# Patient Record
Sex: FEMALE | Race: WHITE | Employment: OTHER | URBAN - METROPOLITAN AREA
[De-identification: names, ages, dates, MRNs, and addresses within clinical notes are randomized per-mention and may not be internally consistent; named-entity substitution may affect disease eponyms.]

---

## 2017-02-27 ENCOUNTER — OFFICE VISIT (OUTPATIENT)
Dept: FAMILY MEDICINE CLINIC | Age: 62
End: 2017-02-27

## 2017-02-27 VITALS
WEIGHT: 293 LBS | DIASTOLIC BLOOD PRESSURE: 70 MMHG | BODY MASS INDEX: 52.42 KG/M2 | SYSTOLIC BLOOD PRESSURE: 122 MMHG | HEART RATE: 52 BPM | OXYGEN SATURATION: 98 %

## 2017-02-27 DIAGNOSIS — R49.0 VOICE HOARSENESS: ICD-10-CM

## 2017-02-27 DIAGNOSIS — J06.9 URI, ACUTE: Primary | ICD-10-CM

## 2017-02-27 PROCEDURE — 99213 OFFICE O/P EST LOW 20 MIN: CPT | Performed by: FAMILY MEDICINE

## 2017-02-27 RX ORDER — LORATADINE 10 MG/1
10 TABLET ORAL DAILY
Qty: 30 TABLET | Refills: 0 | Status: SHIPPED | OUTPATIENT
Start: 2017-02-27 | End: 2018-10-01

## 2017-02-27 RX ORDER — FLUTICASONE PROPIONATE 50 MCG
2 SPRAY, SUSPENSION (ML) NASAL DAILY
Qty: 1 BOTTLE | Refills: 1 | Status: SHIPPED | OUTPATIENT
Start: 2017-02-27 | End: 2017-06-13 | Stop reason: ALTCHOICE

## 2017-03-07 ENCOUNTER — OFFICE VISIT (OUTPATIENT)
Dept: FAMILY MEDICINE CLINIC | Age: 62
End: 2017-03-07

## 2017-03-07 VITALS
BODY MASS INDEX: 52.75 KG/M2 | HEART RATE: 75 BPM | WEIGHT: 293 LBS | OXYGEN SATURATION: 96 % | SYSTOLIC BLOOD PRESSURE: 110 MMHG | DIASTOLIC BLOOD PRESSURE: 66 MMHG

## 2017-03-07 DIAGNOSIS — R09.82 PND (POST-NASAL DRIP): ICD-10-CM

## 2017-03-07 DIAGNOSIS — J06.9 PROTRACTED URI: Primary | ICD-10-CM

## 2017-03-07 PROCEDURE — 99213 OFFICE O/P EST LOW 20 MIN: CPT | Performed by: FAMILY MEDICINE

## 2017-03-07 RX ORDER — AMOXICILLIN 500 MG/1
500 CAPSULE ORAL 3 TIMES DAILY
Qty: 21 CAPSULE | Refills: 0 | Status: SHIPPED | OUTPATIENT
Start: 2017-03-07 | End: 2017-03-14

## 2017-05-03 ENCOUNTER — TELEPHONE (OUTPATIENT)
Dept: FAMILY MEDICINE CLINIC | Age: 62
End: 2017-05-03

## 2017-05-03 DIAGNOSIS — Z00.00 WELL ADULT EXAM: ICD-10-CM

## 2017-05-03 DIAGNOSIS — E55.9 VITAMIN D DEFICIENCY: Primary | ICD-10-CM

## 2017-06-06 DIAGNOSIS — Z00.00 WELL ADULT EXAM: ICD-10-CM

## 2017-06-06 DIAGNOSIS — E55.9 VITAMIN D DEFICIENCY: ICD-10-CM

## 2017-06-06 LAB
A/G RATIO: 1.7 (ref 1.1–2.2)
ALBUMIN SERPL-MCNC: 4.2 G/DL (ref 3.4–5)
ALP BLD-CCNC: 55 U/L (ref 40–129)
ALT SERPL-CCNC: 13 U/L (ref 10–40)
ANION GAP SERPL CALCULATED.3IONS-SCNC: 18 MMOL/L (ref 3–16)
AST SERPL-CCNC: 18 U/L (ref 15–37)
BASOPHILS ABSOLUTE: 0 K/UL (ref 0–0.2)
BASOPHILS RELATIVE PERCENT: 0.6 %
BILIRUB SERPL-MCNC: 0.4 MG/DL (ref 0–1)
BUN BLDV-MCNC: 19 MG/DL (ref 7–20)
CALCIUM SERPL-MCNC: 9 MG/DL (ref 8.3–10.6)
CHLORIDE BLD-SCNC: 98 MMOL/L (ref 99–110)
CHOLESTEROL, TOTAL: 150 MG/DL (ref 0–199)
CO2: 23 MMOL/L (ref 21–32)
CREAT SERPL-MCNC: 0.7 MG/DL (ref 0.6–1.2)
EOSINOPHILS ABSOLUTE: 0.1 K/UL (ref 0–0.6)
EOSINOPHILS RELATIVE PERCENT: 1.5 %
GFR AFRICAN AMERICAN: >60
GFR NON-AFRICAN AMERICAN: >60
GLOBULIN: 2.5 G/DL
GLUCOSE BLD-MCNC: 124 MG/DL (ref 70–99)
HCT VFR BLD CALC: 45.2 % (ref 36–48)
HDLC SERPL-MCNC: 43 MG/DL (ref 40–60)
HEMOGLOBIN: 14.8 G/DL (ref 12–16)
LDL CHOLESTEROL CALCULATED: 90 MG/DL
LYMPHOCYTES ABSOLUTE: 1.8 K/UL (ref 1–5.1)
LYMPHOCYTES RELATIVE PERCENT: 24.1 %
MCH RBC QN AUTO: 32.5 PG (ref 26–34)
MCHC RBC AUTO-ENTMCNC: 32.8 G/DL (ref 31–36)
MCV RBC AUTO: 99 FL (ref 80–100)
MONOCYTES ABSOLUTE: 0.5 K/UL (ref 0–1.3)
MONOCYTES RELATIVE PERCENT: 6 %
NEUTROPHILS ABSOLUTE: 5.1 K/UL (ref 1.7–7.7)
NEUTROPHILS RELATIVE PERCENT: 67.8 %
PDW BLD-RTO: 14.1 % (ref 12.4–15.4)
PLATELET # BLD: 288 K/UL (ref 135–450)
PMV BLD AUTO: 8.8 FL (ref 5–10.5)
POTASSIUM SERPL-SCNC: 4.3 MMOL/L (ref 3.5–5.1)
RBC # BLD: 4.56 M/UL (ref 4–5.2)
SODIUM BLD-SCNC: 139 MMOL/L (ref 136–145)
TOTAL PROTEIN: 6.7 G/DL (ref 6.4–8.2)
TRIGL SERPL-MCNC: 86 MG/DL (ref 0–150)
VLDLC SERPL CALC-MCNC: 17 MG/DL
WBC # BLD: 7.5 K/UL (ref 4–11)

## 2017-06-07 LAB
TSH REFLEX: 3.26 UIU/ML (ref 0.27–4.2)
VITAMIN D 25-HYDROXY: 19.7 NG/ML

## 2017-06-13 ENCOUNTER — OFFICE VISIT (OUTPATIENT)
Dept: FAMILY MEDICINE CLINIC | Age: 62
End: 2017-06-13

## 2017-06-13 VITALS
HEIGHT: 65 IN | DIASTOLIC BLOOD PRESSURE: 60 MMHG | OXYGEN SATURATION: 98 % | BODY MASS INDEX: 48.82 KG/M2 | HEART RATE: 77 BPM | SYSTOLIC BLOOD PRESSURE: 112 MMHG | WEIGHT: 293 LBS | RESPIRATION RATE: 14 BRPM

## 2017-06-13 DIAGNOSIS — Z00.00 ANNUAL PHYSICAL EXAM: Primary | ICD-10-CM

## 2017-06-13 DIAGNOSIS — E55.9 VITAMIN D DEFICIENCY: ICD-10-CM

## 2017-06-13 DIAGNOSIS — R73.03 PREDIABETES: ICD-10-CM

## 2017-06-13 DIAGNOSIS — E66.01 MORBID OBESITY DUE TO EXCESS CALORIES (HCC): ICD-10-CM

## 2017-06-13 LAB
BILIRUBIN, POC: NORMAL
BLOOD URINE, POC: NORMAL
CLARITY, POC: CLEAR
COLOR, POC: YELLOW
GLUCOSE URINE, POC: NORMAL
KETONES, POC: NORMAL
LEUKOCYTE EST, POC: NORMAL
NITRITE, POC: NORMAL
PH, POC: 5.5
PROTEIN, POC: NORMAL
SPECIFIC GRAVITY, POC: 1.02
UROBILINOGEN, POC: 0.2

## 2017-06-13 PROCEDURE — 81002 URINALYSIS NONAUTO W/O SCOPE: CPT | Performed by: FAMILY MEDICINE

## 2017-06-13 PROCEDURE — 99396 PREV VISIT EST AGE 40-64: CPT | Performed by: FAMILY MEDICINE

## 2017-06-13 ASSESSMENT — PATIENT HEALTH QUESTIONNAIRE - PHQ9
1. LITTLE INTEREST OR PLEASURE IN DOING THINGS: 0
2. FEELING DOWN, DEPRESSED OR HOPELESS: 0
SUM OF ALL RESPONSES TO PHQ9 QUESTIONS 1 & 2: 0
SUM OF ALL RESPONSES TO PHQ QUESTIONS 1-9: 0

## 2017-06-21 ENCOUNTER — TELEPHONE (OUTPATIENT)
Dept: FAMILY MEDICINE CLINIC | Age: 62
End: 2017-06-21

## 2017-06-21 ENCOUNTER — OFFICE VISIT (OUTPATIENT)
Dept: FAMILY MEDICINE CLINIC | Age: 62
End: 2017-06-21

## 2017-06-21 VITALS
HEART RATE: 72 BPM | DIASTOLIC BLOOD PRESSURE: 78 MMHG | OXYGEN SATURATION: 97 % | RESPIRATION RATE: 16 BRPM | SYSTOLIC BLOOD PRESSURE: 128 MMHG

## 2017-06-21 DIAGNOSIS — Z01.419 WELL WOMAN EXAM: Primary | ICD-10-CM

## 2017-06-21 DIAGNOSIS — Z00.00 ANNUAL PHYSICAL EXAM: ICD-10-CM

## 2017-06-21 PROCEDURE — 93000 ELECTROCARDIOGRAM COMPLETE: CPT | Performed by: FAMILY MEDICINE

## 2017-06-21 PROCEDURE — 99396 PREV VISIT EST AGE 40-64: CPT | Performed by: FAMILY MEDICINE

## 2017-06-21 RX ORDER — MELATONIN
1 DAILY
COMMUNITY
End: 2017-06-21 | Stop reason: DRUGHIGH

## 2017-06-21 RX ORDER — ACETAMINOPHEN 160 MG
1 TABLET,DISINTEGRATING ORAL DAILY
COMMUNITY

## 2017-08-16 ENCOUNTER — OFFICE VISIT (OUTPATIENT)
Dept: FAMILY MEDICINE CLINIC | Age: 62
End: 2017-08-16

## 2017-08-16 VITALS
SYSTOLIC BLOOD PRESSURE: 118 MMHG | HEART RATE: 69 BPM | BODY MASS INDEX: 51.55 KG/M2 | WEIGHT: 293 LBS | DIASTOLIC BLOOD PRESSURE: 82 MMHG | OXYGEN SATURATION: 97 %

## 2017-08-16 DIAGNOSIS — E66.01 MORBID OBESITY DUE TO EXCESS CALORIES (HCC): Primary | ICD-10-CM

## 2017-08-16 DIAGNOSIS — E55.9 VITAMIN D DEFICIENCY: ICD-10-CM

## 2017-08-16 DIAGNOSIS — Z11.59 ENCOUNTER FOR HEPATITIS C SCREENING TEST FOR LOW RISK PATIENT: ICD-10-CM

## 2017-08-16 PROCEDURE — 99242 OFF/OP CONSLTJ NEW/EST SF 20: CPT | Performed by: FAMILY MEDICINE

## 2017-12-21 ENCOUNTER — OFFICE VISIT (OUTPATIENT)
Dept: FAMILY MEDICINE CLINIC | Age: 62
End: 2017-12-21

## 2017-12-21 VITALS
HEART RATE: 73 BPM | DIASTOLIC BLOOD PRESSURE: 80 MMHG | BODY MASS INDEX: 48.65 KG/M2 | OXYGEN SATURATION: 97 % | WEIGHT: 292 LBS | SYSTOLIC BLOOD PRESSURE: 128 MMHG | RESPIRATION RATE: 14 BRPM | HEIGHT: 65 IN

## 2017-12-21 DIAGNOSIS — E66.01 MORBID OBESITY (HCC): ICD-10-CM

## 2017-12-21 DIAGNOSIS — E55.9 VITAMIN D DEFICIENCY: Primary | ICD-10-CM

## 2017-12-21 DIAGNOSIS — F43.21 GRIEF: ICD-10-CM

## 2017-12-21 PROCEDURE — 99214 OFFICE O/P EST MOD 30 MIN: CPT | Performed by: FAMILY MEDICINE

## 2017-12-21 NOTE — PATIENT INSTRUCTIONS
Patient Education          calcitriol (vitamin D3)  Pronunciation:  laureen abel BERGERON ol  Brand:  Rocaltrol  What is the most important information I should know about calcitriol? You should not use this medication if you have high levels of calcium or vitamin D in your blood, or if you have ever had an allergic reaction to calcitriol or other forms of vitamin D. Drink plenty of fluids unless your doctor has told you to restrict your fluid intake. Call your doctor if you have ongoing vomiting or diarrhea, or if you are sweating more than usual. You can easily become dehydrated while taking this medication, which can lead to a serious electrolyte imbalance. Do not take other vitamin or mineral supplements unless your doctor has told you to. Avoid using antacids without your doctor's advice. Use only the type of antacid your doctor recommends. Some antacids can make it harder for your body to absorb calcitriol. To be sure this medication is helping your condition, your blood will need to be tested on a regular basis. Do not miss any scheduled appointments. Calcitriol is only part of a complete program of treatment that may also include a special diet. Follow your diet and medication routines very closely. You should become very familiar with the list of foods you must eat or avoid to help control your condition. What is calcitriol? Calcitriol is vitamin D3. Vitamin D is important for the absorption of calcium from the stomach and for the functioning of calcium in the body. Calcitriol is used to treat hyperparathyroidism (overactive parathyroid glands) and metabolic bone disease in people who have chronic kidney failure and are not receiving dialysis. Calcitriol is also used to treat calcium deficiency (hypocalcemia) and metabolic bone disease in people who are receiving dialysis.   Calcitriol is also used to treat calcium deficiency in people with hypoparathyroidism (underactive parathyroid glands) caused by become very familiar with the list of foods you must eat or avoid to help control your condition. If you need to be on bed-rest or have any type of surgery, your dose or testing needs may change. Follow your doctor's instructions. Store at room temperature away from moisture, light, and heat. What happens if I miss a dose? Take the missed dose as soon as you remember. Skip the missed dose if it is almost time for your next scheduled dose. Do not  take extra medicine to make up the missed dose. What happens if I overdose? Seek emergency medical attention or call the Poison Help line at 1-890.561.3821. Overdose symptoms may include some of the serious side effects listed in this medication guide. What should I avoid while taking calcitriol? Do not take other vitamin or mineral supplements unless your doctor has told you to. Avoid using antacids without your doctor's advice. Use only the type of antacid your doctor recommends. Some antacids can make it harder for your body to absorb calcitriol. What are the possible side effects of calcitriol? Get emergency medical help if you have any of these signs of an allergic reaction: hives; difficulty breathing; swelling of your face, lips, tongue, or throat. Stop using calcitriol and call your doctor at once if you have a serious side effect such as:  · weakness, headache, drowsiness;  · nausea, vomiting, stomach pain, constipation;  · loss of appetite;  · dry mouth, increased thirst;  · metallic taste in your mouth;  · urinating more than usual;  · fast, slow, or uneven heart rate;  · changes in behavior;  · muscle pain, bone pain, muscle weakness, loss of height;  · slow growth (in a child taking calcitriol); or  · severe pain in your upper stomach spreading to your back. This is not a complete list of side effects and others may occur. Call your doctor for medical advice about side effects. You may report side effects to FDA at 5-746-VGU-4583.   What other

## 2017-12-21 NOTE — PROGRESS NOTES
Joslyn Hernandez is a 63 y.o. female. HPI:  Here for general checkup   passed away recently but she is doing well has lost the family and community support  Only medicines vitamin D  Appetites been weak as her  was getting sicker and sicker  She is coping by taking walks and spending more time with family friends and [employer]  Mild cold the last 2 or 3 days was no full cough and sore throat  Wt Readings from Last 3 Encounters:   12/21/17 292 lb (132.5 kg)   08/16/17 (!) 309 lb 12.8 oz (140.5 kg)   06/13/17 (!) 315 lb (142.9 kg)     Meds, vitamins and allergies reviewed with Patient    ROS:  Gen: No fever  HEENT: Mild cold symptoms, slight sore throat. CV:  Denies chest pain or palpitations. Pulm:  Denies shortness of breath, nonproductive cough. Abd:  Denies abdominal pain, nausea and vomiting. Skin: no rash    Allergies   Allergen Reactions    Levaquin [Levofloxacin In D5w] Other (See Comments)     headache       Prior to Visit Medications    Medication Sig Taking? Authorizing Provider   Cholecalciferol (VITAMIN D3) 2000 units CAPS Take 1 capsule by mouth daily Yes Historical Provider, MD   loratadine (CLARITIN) 10 MG tablet Take 1 tablet by mouth daily  Nat Leahy MD       OBJECTIVE:  /80 (Site: Left Arm, Position: Sitting, Cuff Size: Medium Adult)   Pulse 73   Resp 14   Ht 5' 5\" (1.651 m)   Wt 292 lb (132.5 kg)   SpO2 97%   BMI 48.59 kg/m²   GEN:  in NADObese down 15 pounds  HEENT:  NCAT, TMs:normal and throat: Slightly red no exudate  NECK:  Supple without adenopathy. CV:  Regular rate and rhythm, S1 and S2 normal, no murmurs, clicks  PULM:  Chest is clear, no wheezing ,  symmetric air entry throughout both lung fields  Vitamin D-39  Hepatitis C negative  ASSESSMENT/PLAN:  1. Vitamin D deficiency  Corrected, continue present supplementation    2. Morbid obesity ([address]  Continue efforts at weight loss with smart diet and regular exercise    3.  Grief  Grieving as expected, offered stress further support as needed    4 uri supportive treatment no need for antibiotics    #5 immunizations up-to-date    Spent 25 minutes with patient greater than 50% of time

## 2018-01-02 ENCOUNTER — OFFICE VISIT (OUTPATIENT)
Dept: FAMILY MEDICINE CLINIC | Age: 63
End: 2018-01-02

## 2018-01-02 VITALS
RESPIRATION RATE: 14 BRPM | HEIGHT: 65 IN | WEIGHT: 293 LBS | TEMPERATURE: 98.4 F | HEART RATE: 96 BPM | OXYGEN SATURATION: 97 % | SYSTOLIC BLOOD PRESSURE: 130 MMHG | DIASTOLIC BLOOD PRESSURE: 80 MMHG | BODY MASS INDEX: 48.82 KG/M2

## 2018-01-02 DIAGNOSIS — N76.4 BOIL, LABIUM: Primary | ICD-10-CM

## 2018-01-02 PROCEDURE — 99211 OFF/OP EST MAY X REQ PHY/QHP: CPT | Performed by: FAMILY MEDICINE

## 2018-01-02 RX ORDER — CEPHALEXIN 500 MG/1
500 CAPSULE ORAL 3 TIMES DAILY
Qty: 21 CAPSULE | Refills: 0 | Status: SHIPPED | OUTPATIENT
Start: 2018-01-02 | End: 2018-08-20 | Stop reason: ALTCHOICE

## 2018-01-02 NOTE — PROGRESS NOTES
Subjective:      Patient ID: Kajal Garcia is a 63 y.o. female.     HPI soreness in the right vaginal area for the last 4 days  Has spontaneous drainage yesterday and now the pain is much less    Review of Systems  No fever or chills  Objective:   Physical Exam  Small right labial abscess that appears to have dried up it is open to Katie Wilson might 2 mm hole was serosanguineous fluid no pus or redness  Assessment:      Right labial abscess with spontaneous drainage      Plan:      Hot compresses   Keflex   Warning signs

## 2018-01-16 ENCOUNTER — TELEPHONE (OUTPATIENT)
Dept: FAMILY MEDICINE CLINIC | Age: 63
End: 2018-01-16

## 2018-03-16 ENCOUNTER — OFFICE VISIT (OUTPATIENT)
Dept: FAMILY MEDICINE CLINIC | Age: 63
End: 2018-03-16

## 2018-03-16 VITALS
SYSTOLIC BLOOD PRESSURE: 124 MMHG | HEART RATE: 70 BPM | BODY MASS INDEX: 48.06 KG/M2 | OXYGEN SATURATION: 98 % | DIASTOLIC BLOOD PRESSURE: 76 MMHG | WEIGHT: 288.8 LBS

## 2018-03-16 DIAGNOSIS — E66.01 MORBID OBESITY (HCC): ICD-10-CM

## 2018-03-16 DIAGNOSIS — F43.21 GRIEF: ICD-10-CM

## 2018-03-16 DIAGNOSIS — E55.9 VITAMIN D DEFICIENCY: Primary | ICD-10-CM

## 2018-03-16 PROCEDURE — 99212 OFFICE O/P EST SF 10 MIN: CPT | Performed by: FAMILY MEDICINE

## 2018-03-16 NOTE — PROGRESS NOTES
murmurs, clicks  PULM:  Chest is clear, no wheezing ,  symmetric air entry throughout both lung fields. ABD: Soft, NT  Pelvic exam deferred patient preference    ASSESSMENT/PLAN:  1. Vitamin D deficiency   over-the-counter vitamin D    2. Morbid obesity ([address]  Continue going to the health club and eating sensibly    3.  Grief  Suggested grief counseling through [employer] that she has no insurance    #4 STD exposure patient appears to be a very low risk having had unprotected intercourse just wants with a  man  Told her what worrisome symptoms to look for  Safe sex in the future  Pap and pelvic exam once she has insurance

## 2018-07-23 ENCOUNTER — PROCEDURE VISIT (OUTPATIENT)
Dept: SURGERY | Age: 63
End: 2018-07-23

## 2018-07-23 VITALS
HEART RATE: 71 BPM | DIASTOLIC BLOOD PRESSURE: 84 MMHG | BODY MASS INDEX: 44.36 KG/M2 | TEMPERATURE: 98.6 F | OXYGEN SATURATION: 96 % | WEIGHT: 266.6 LBS | SYSTOLIC BLOOD PRESSURE: 142 MMHG

## 2018-07-23 DIAGNOSIS — C44.319 BASAL CELL CARCINOMA OF RIGHT FOREHEAD: Primary | ICD-10-CM

## 2018-07-23 PROCEDURE — 17312 MOHS ADDL STAGE: CPT | Performed by: DERMATOLOGY

## 2018-07-23 PROCEDURE — 14040 TIS TRNFR F/C/C/M/N/A/G/H/F: CPT | Performed by: DERMATOLOGY

## 2018-07-23 PROCEDURE — 17311 MOHS 1 STAGE H/N/HF/G: CPT | Performed by: DERMATOLOGY

## 2018-07-23 NOTE — PROGRESS NOTES
MOHS PROCEDURE NOTE    PHYSICIAN:  Carlos Monterroso. Zahira Sahni MD    ASSISTANT: Johana Owusu LPN       REFERRING PROVIDER:  Idania Gayle MD      PREOPERATIVE DIAGNOSIS: Nodular Basal Cell Carcinoma     SPECIFIC MOHS INDICATIONS:  location    AUC SCORIN/9    POSTOPERATIVE DIAGNOSIS: SAME    LOCATION: Right forehead    OPERATIVE PROCEDURE:  MOHS MICROGRAPHIC SURGERY    RECONSTRUCTION OF DEFECT: Unilateral Advancement Flap    PREOPERATIVE SIZE: 9x8 MM    DEFECT SIZE: 13x13 MM    LENGTH OF REPAIRED WOUND/SIZE OF FLAP/SIZE OF GRAFT:  29x20 MM = 5.8cm2    ANESTHESIA:  3.5mL 1% lidocaine with epinephrine 1:100,000 buffered. EBL:  MINIMAL    DURATION OF PROCEDURE:  1 HOURS    POSTOPERATIVE OBSERVATION: 1 HOUR    SPECIMENS:  SEE MOHS MAP    COMPLICATIONS:  NONE    DESCRIPTION OF PROCEDURE:  The patient was given a mirror, as appropriate, and the biopsy site was identified, marked with a surgical marking pen, and verified by the patient. Options for treatment were discussed and the patient was informed that Mohs surgery was the selected treatment based on its lower recurrence rate, given the features listed above, as compared to other treatment modalities such as excision, radiation, or curettage, and agreed with this treatment plan. Risks and benefits including bruising, swelling, bleeding, infection, nerve injury, recurrence, and scarring were discussed with the patient prior to the procedure and a written consent detailing these and other risks was reviewed with the patient and signed. There was a time out for person and procedure verification. The surgical site was prepped with an antiseptic solution. Application of an antiseptic solution was repeated before each surgical stage. Stage I:  The clinically-apparent tumor was carefully defined and debulked, determining the edge of the surgical excision.     A thin layer of tumor-laden tissue was excised with a narrow margin of normal-appearing skin, using the

## 2018-07-24 ENCOUNTER — TELEPHONE (OUTPATIENT)
Dept: SURGERY | Age: 63
End: 2018-07-24

## 2018-07-24 NOTE — TELEPHONE ENCOUNTER
The patient was in the office on 7/25/18 for mohs located on the right forehead with unilateral advancement flap repair. The patient tolerated the procedure well and left the office in good condition. A post-operative telephone call was placed at 2:15 pm in order to check on the patient's recovery process. The patient reported doing well and had no complaints. All of the patient's questions were answered.

## 2018-07-30 ENCOUNTER — NURSE ONLY (OUTPATIENT)
Dept: SURGERY | Age: 63
End: 2018-07-30

## 2018-07-30 DIAGNOSIS — Z48.02 VISIT FOR SUTURE REMOVAL: Primary | ICD-10-CM

## 2018-07-30 NOTE — PROGRESS NOTES
S:  The patient is here for suture removal s/p Mohs surgery on the Right forehead and Unilateral Advancement Flap repair, 1 week(s) ago. The site appears well-healed without signs of infection (redness, pain or discharge). The sutures were removed. Wound care and activity instructions given. The patient was scheduled for follow-up as needed for scar/wound check. The patient was scheduled for f/u with General Dermatology per their instructions.

## 2018-07-30 NOTE — PATIENT INSTRUCTIONS
WOUND CARE AFTER SUTURE REMOVAL    After your stiches have been removed, your scar is still very fragile. In fact, scars continue to change and evolve, what we call remodel, for about a year after your procedure. Follow the following steps below to ensure that your scar heals well. Instructions    1. If Steri-strips were applied, keep them on until they fall off on their own. 2. Protect your scar from the sun. Use a sunscreen or bandage to cover your scar. Tran Ng exposure can cause your scar to become discolored and appear red or brown. 3. To help soften your scar more rapidly, it is helpful, but not necessary, for you to   massage the scar gently each night for twenty minutes. 4. Spitting suture. Occasionally, an inside suture (stitch) does not completely dissolve. When this happens, (generally 4-8 weeks after surgery), it causes a bump or pimple to form on the scar. This is easily removed and is not at all serious. It   does not mean the skin cancer has returned. Contact us if it happens, but do not be alarmed. 5. If you scar becomes tender, itchy or becomes very large, let Dr. Justin Malave know. There    are treatments that can improve the appearance of your scar or help make it more comfortable.

## 2018-08-20 ENCOUNTER — PROCEDURE VISIT (OUTPATIENT)
Dept: SURGERY | Age: 63
End: 2018-08-20

## 2018-08-20 VITALS
OXYGEN SATURATION: 95 % | SYSTOLIC BLOOD PRESSURE: 138 MMHG | BODY MASS INDEX: 44.1 KG/M2 | DIASTOLIC BLOOD PRESSURE: 87 MMHG | HEART RATE: 46 BPM | WEIGHT: 265 LBS | TEMPERATURE: 97.5 F

## 2018-08-20 DIAGNOSIS — C44.319 BASAL CELL CARCINOMA (BCC) OF SKIN OF OTHER PART OF FACE: Primary | ICD-10-CM

## 2018-08-20 PROBLEM — C44.91 BASAL CELL CARCINOMA: Status: ACTIVE | Noted: 2018-08-20

## 2018-08-20 PROCEDURE — 17311 MOHS 1 STAGE H/N/HF/G: CPT | Performed by: DERMATOLOGY

## 2018-08-20 PROCEDURE — 12051 INTMD RPR FACE/MM 2.5 CM/<: CPT | Performed by: DERMATOLOGY

## 2018-08-20 NOTE — PROGRESS NOTES
MOHS PROCEDURE NOTE    PHYSICIAN:  Carlos Monterroso. Zahira Sahni MD    ASSISTANT: Lakshmi Mcintyre RN and Rupal Gonzalez     REFERRING PROVIDER:  Idania Gayle MD    PREOPERATIVE DIAGNOSIS: Nodular Basal Cell Carcinoma     SPECIFIC MOHS INDICATIONS:  location    AUC SCORIN/9    POSTOPERATIVE DIAGNOSIS: SAME    LOCATION: Right periorbital     OPERATIVE PROCEDURE:  MOHS MICROGRAPHIC SURGERY    RECONSTRUCTION OF DEFECT: Intermediate layered closure    PREOPERATIVE SIZE: 6x4 MM    DEFECT SIZE: 7x5 MM    LENGTH OF REPAIRED WOUND/SIZE OF FLAP/SIZE OF GRAFT:  25 MM    ANESTHESIA:  2.5mL 1% lidocaine with epinephrine 1:100,000 buffered. EBL:  MINIMAL    DURATION OF PROCEDURE:  1 HOURS    POSTOPERATIVE OBSERVATION: 30 MINUTES    SPECIMENS:  SEE MOHS MAP    COMPLICATIONS:  NONE    DESCRIPTION OF PROCEDURE:  The patient was given a mirror, as appropriate, and the biopsy site was identified, marked with a surgical marking pen, and verified by the patient. Options for treatment were discussed and the patient was informed that Mohs surgery was the selected treatment based on its lower recurrence rate, given the features listed above, as compared to other treatment modalities such as excision, radiation, or curettage, and agreed with this treatment plan. Risks and benefits including bruising, swelling, bleeding, infection, nerve injury, recurrence, and scarring were discussed with the patient prior to the procedure and a written consent detailing these and other risks was reviewed with the patient and signed. There was a time out for person and procedure verification. The surgical site was prepped with an antiseptic solution. Application of an antiseptic solution was repeated before each surgical stage. Stage I:  The clinically-apparent tumor was carefully defined and debulked, determining the edge of the surgical excision.     A thin layer of tumor-laden tissue was excised with a narrow margin of normal-appearing

## 2018-08-21 ENCOUNTER — TELEPHONE (OUTPATIENT)
Dept: SURGERY | Age: 63
End: 2018-08-21

## 2018-10-01 ENCOUNTER — OFFICE VISIT (OUTPATIENT)
Dept: FAMILY MEDICINE CLINIC | Age: 63
End: 2018-10-01

## 2018-10-01 VITALS
HEART RATE: 59 BPM | HEIGHT: 65 IN | DIASTOLIC BLOOD PRESSURE: 76 MMHG | SYSTOLIC BLOOD PRESSURE: 122 MMHG | OXYGEN SATURATION: 97 % | BODY MASS INDEX: 43.52 KG/M2 | WEIGHT: 261.2 LBS

## 2018-10-01 DIAGNOSIS — E66.01 MORBID OBESITY (HCC): Primary | ICD-10-CM

## 2018-10-01 DIAGNOSIS — E55.9 VITAMIN D DEFICIENCY: ICD-10-CM

## 2018-10-01 DIAGNOSIS — C44.319 BASAL CELL CARCINOMA (BCC) OF SKIN OF OTHER PART OF FACE: ICD-10-CM

## 2018-10-01 PROCEDURE — 99212 OFFICE O/P EST SF 10 MIN: CPT | Performed by: FAMILY MEDICINE

## 2018-10-01 ASSESSMENT — PATIENT HEALTH QUESTIONNAIRE - PHQ9
1. LITTLE INTEREST OR PLEASURE IN DOING THINGS: 0
2. FEELING DOWN, DEPRESSED OR HOPELESS: 0
SUM OF ALL RESPONSES TO PHQ QUESTIONS 1-9: 0
SUM OF ALL RESPONSES TO PHQ QUESTIONS 1-9: 0
SUM OF ALL RESPONSES TO PHQ9 QUESTIONS 1 & 2: 0

## 2019-09-17 ENCOUNTER — TELEPHONE (OUTPATIENT)
Dept: FAMILY MEDICINE CLINIC | Age: 64
End: 2019-09-17

## 2019-09-17 DIAGNOSIS — Z00.00 ANNUAL PHYSICAL EXAM: ICD-10-CM

## 2019-09-17 DIAGNOSIS — E55.9 VITAMIN D DEFICIENCY: Primary | ICD-10-CM

## 2019-09-17 DIAGNOSIS — Z12.39 SCREENING FOR BREAST CANCER: ICD-10-CM

## 2019-09-25 ENCOUNTER — HOSPITAL ENCOUNTER (OUTPATIENT)
Age: 64
Discharge: HOME | End: 2019-09-25
Payer: COMMERCIAL

## 2019-09-25 DIAGNOSIS — E55.9 VITAMIN D DEFICIENCY: ICD-10-CM

## 2019-09-25 DIAGNOSIS — Z00.00 ANNUAL PHYSICAL EXAM: ICD-10-CM

## 2019-09-25 LAB
A/G RATIO: 1.4 (ref 1.1–2.2)
ALBUMIN SERPL-MCNC: 4.3 G/DL (ref 3.4–5)
ALP BLD-CCNC: 58 U/L (ref 40–129)
ALT SERPL-CCNC: 10 U/L (ref 10–40)
ANION GAP SERPL CALCULATED.3IONS-SCNC: 13 MMOL/L (ref 3–16)
AST SERPL-CCNC: 18 U/L (ref 15–37)
BASOPHILS ABSOLUTE: 0.1 K/UL (ref 0–0.2)
BASOPHILS RELATIVE PERCENT: 0.8 %
BILIRUB SERPL-MCNC: 0.4 MG/DL (ref 0–1)
BUN BLDV-MCNC: 16 MG/DL (ref 7–20)
CALCIUM SERPL-MCNC: 9.8 MG/DL (ref 8.3–10.6)
CHLORIDE BLD-SCNC: 105 MMOL/L (ref 99–110)
CHOLESTEROL, TOTAL: 150 MG/DL (ref 0–199)
CO2: 25 MMOL/L (ref 21–32)
CREAT SERPL-MCNC: 0.8 MG/DL (ref 0.6–1.2)
EOSINOPHILS ABSOLUTE: 0.1 K/UL (ref 0–0.6)
EOSINOPHILS RELATIVE PERCENT: 0.8 %
GFR AFRICAN AMERICAN: >60
GFR NON-AFRICAN AMERICAN: >60
GLOBULIN: 3 G/DL
GLUCOSE BLD-MCNC: 115 MG/DL (ref 70–99)
HCT VFR BLD CALC: 44.1 % (ref 36–48)
HDLC SERPL-MCNC: 46 MG/DL (ref 40–60)
HEMOGLOBIN: 14.6 G/DL (ref 12–16)
HIV AG/AB: NORMAL
HIV ANTIGEN: NORMAL
HIV-1 ANTIBODY: NORMAL
HIV-2 AB: NORMAL
LDL CHOLESTEROL CALCULATED: 90 MG/DL
LYMPHOCYTES ABSOLUTE: 1.8 K/UL (ref 1–5.1)
LYMPHOCYTES RELATIVE PERCENT: 22.9 %
MCH RBC QN AUTO: 32.8 PG (ref 26–34)
MCHC RBC AUTO-ENTMCNC: 33.2 G/DL (ref 31–36)
MCV RBC AUTO: 98.8 FL (ref 80–100)
MONOCYTES ABSOLUTE: 0.5 K/UL (ref 0–1.3)
MONOCYTES RELATIVE PERCENT: 6.9 %
NEUTROPHILS ABSOLUTE: 5.5 K/UL (ref 1.7–7.7)
NEUTROPHILS RELATIVE PERCENT: 68.6 %
PDW BLD-RTO: 13.5 % (ref 12.4–15.4)
PLATELET # BLD: 309 K/UL (ref 135–450)
PMV BLD AUTO: 9.1 FL (ref 5–10.5)
POTASSIUM SERPL-SCNC: 5.1 MMOL/L (ref 3.5–5.1)
RBC # BLD: 4.46 M/UL (ref 4–5.2)
SODIUM BLD-SCNC: 143 MMOL/L (ref 136–145)
TOTAL PROTEIN: 7.3 G/DL (ref 6.4–8.2)
TRIGL SERPL-MCNC: 71 MG/DL (ref 0–150)
TSH SERPL DL<=0.05 MIU/L-ACNC: 3.14 UIU/ML (ref 0.27–4.2)
VITAMIN D 25-HYDROXY: 33.4 NG/ML
VLDLC SERPL CALC-MCNC: 14 MG/DL
WBC # BLD: 8 K/UL (ref 4–11)

## 2019-09-25 PROCEDURE — 86702 HIV-2 ANTIBODY: CPT

## 2019-09-25 PROCEDURE — 36415 COLL VENOUS BLD VENIPUNCTURE: CPT

## 2019-09-25 PROCEDURE — 83036 HEMOGLOBIN GLYCOSYLATED A1C: CPT

## 2019-09-25 PROCEDURE — 82306 VITAMIN D 25 HYDROXY: CPT

## 2019-09-25 PROCEDURE — 85025 COMPLETE CBC W/AUTO DIFF WBC: CPT

## 2019-09-25 PROCEDURE — 86701 HIV-1ANTIBODY: CPT

## 2019-09-25 PROCEDURE — 80061 LIPID PANEL: CPT

## 2019-09-25 PROCEDURE — 84443 ASSAY THYROID STIM HORMONE: CPT

## 2019-09-25 PROCEDURE — 80053 COMPREHEN METABOLIC PANEL: CPT

## 2019-09-25 PROCEDURE — 87390 HIV-1 AG IA: CPT

## 2019-09-26 LAB
ESTIMATED AVERAGE GLUCOSE: 114 MG/DL
HBA1C MFR BLD: 5.6 %

## 2019-10-07 ENCOUNTER — OFFICE VISIT (OUTPATIENT)
Dept: FAMILY MEDICINE CLINIC | Age: 64
End: 2019-10-07
Payer: COMMERCIAL

## 2019-10-07 VITALS
OXYGEN SATURATION: 96 % | SYSTOLIC BLOOD PRESSURE: 128 MMHG | DIASTOLIC BLOOD PRESSURE: 80 MMHG | HEIGHT: 65 IN | WEIGHT: 282 LBS | TEMPERATURE: 98 F | BODY MASS INDEX: 46.98 KG/M2 | HEART RATE: 55 BPM

## 2019-10-07 DIAGNOSIS — E55.9 VITAMIN D DEFICIENCY: ICD-10-CM

## 2019-10-07 DIAGNOSIS — Z00.00 ANNUAL PHYSICAL EXAM: ICD-10-CM

## 2019-10-07 DIAGNOSIS — C44.319 BASAL CELL CARCINOMA (BCC) OF SKIN OF OTHER PART OF FACE: Primary | ICD-10-CM

## 2019-10-07 DIAGNOSIS — E66.01 CLASS 3 SEVERE OBESITY DUE TO EXCESS CALORIES WITHOUT SERIOUS COMORBIDITY WITH BODY MASS INDEX (BMI) OF 40.0 TO 44.9 IN ADULT (HCC): ICD-10-CM

## 2019-10-07 LAB
BILIRUBIN, POC: NORMAL
BLOOD URINE, POC: NORMAL
CLARITY, POC: CLEAR
COLOR, POC: YELLOW
GLUCOSE URINE, POC: NORMAL
KETONES, POC: NORMAL
LEUKOCYTE EST, POC: NORMAL
NITRITE, POC: NORMAL
PH, POC: 6
PROTEIN, POC: NORMAL
SPECIFIC GRAVITY, POC: 1.02
UROBILINOGEN, POC: 0.2

## 2019-10-07 PROCEDURE — 99396 PREV VISIT EST AGE 40-64: CPT | Performed by: FAMILY MEDICINE

## 2019-10-07 ASSESSMENT — PATIENT HEALTH QUESTIONNAIRE - PHQ9
2. FEELING DOWN, DEPRESSED OR HOPELESS: 0
1. LITTLE INTEREST OR PLEASURE IN DOING THINGS: 0
SUM OF ALL RESPONSES TO PHQ QUESTIONS 1-9: 0
SUM OF ALL RESPONSES TO PHQ9 QUESTIONS 1 & 2: 0
SUM OF ALL RESPONSES TO PHQ QUESTIONS 1-9: 0

## 2019-10-08 ENCOUNTER — OFFICE VISIT (OUTPATIENT)
Dept: FAMILY MEDICINE CLINIC | Age: 64
End: 2019-10-08
Payer: COMMERCIAL

## 2019-10-08 VITALS
HEART RATE: 52 BPM | WEIGHT: 281.97 LBS | SYSTOLIC BLOOD PRESSURE: 122 MMHG | HEIGHT: 65 IN | DIASTOLIC BLOOD PRESSURE: 76 MMHG | OXYGEN SATURATION: 99 % | TEMPERATURE: 98.1 F | BODY MASS INDEX: 46.98 KG/M2

## 2019-10-08 DIAGNOSIS — Z78.0 MENOPAUSE: Primary | ICD-10-CM

## 2019-10-08 DIAGNOSIS — Z01.419 WELL WOMAN EXAM: ICD-10-CM

## 2019-10-08 DIAGNOSIS — E66.01 MORBID OBESITY (HCC): ICD-10-CM

## 2019-10-08 PROCEDURE — 99214 OFFICE O/P EST MOD 30 MIN: CPT | Performed by: FAMILY MEDICINE

## 2020-04-14 ENCOUNTER — TELEPHONE (OUTPATIENT)
Dept: FAMILY MEDICINE CLINIC | Age: 65
End: 2020-04-14

## 2020-04-14 ENCOUNTER — NURSE TRIAGE (OUTPATIENT)
Dept: OTHER | Facility: CLINIC | Age: 65
End: 2020-04-14

## 2020-04-14 NOTE — TELEPHONE ENCOUNTER
Received transfer from nurse triage recommending pt schedule with PCP within 3 days. Pt scheduled in office visit for at [facility] for 4/16/20. Per pt symptoms are:  Radiating pain from left shoulder to wrist  Not sore to touch  Started approximately 1 week ago, pain level jumped to 7 - 7.5 out of 10 on 4/13/20. Pt is asking if she should keep OV, pt stated she doesn't want to wait until 4/17/20 for VV.   Pt also asked if she can take Tylenol 500 MG for pain    Please contact pt at [phone] to advise

## 2020-04-14 NOTE — TELEPHONE ENCOUNTER
Can suggest ibuprofen 800 mg 3 times daily with food. I can see her at [facility] on Thursday if need be.

## 2020-04-16 ENCOUNTER — OFFICE VISIT (OUTPATIENT)
Dept: FAMILY MEDICINE CLINIC | Age: 65
End: 2020-04-16
Payer: COMMERCIAL

## 2020-04-16 VITALS
DIASTOLIC BLOOD PRESSURE: 88 MMHG | SYSTOLIC BLOOD PRESSURE: 138 MMHG | WEIGHT: 288 LBS | BODY MASS INDEX: 47.98 KG/M2 | OXYGEN SATURATION: 96 % | TEMPERATURE: 97.5 F | HEIGHT: 65 IN | HEART RATE: 78 BPM

## 2020-04-16 PROCEDURE — 99213 OFFICE O/P EST LOW 20 MIN: CPT | Performed by: FAMILY MEDICINE

## 2020-04-16 ASSESSMENT — PATIENT HEALTH QUESTIONNAIRE - PHQ9
SUM OF ALL RESPONSES TO PHQ QUESTIONS 1-9: 0
1. LITTLE INTEREST OR PLEASURE IN DOING THINGS: 0
2. FEELING DOWN, DEPRESSED OR HOPELESS: 0
SUM OF ALL RESPONSES TO PHQ9 QUESTIONS 1 & 2: 0
SUM OF ALL RESPONSES TO PHQ QUESTIONS 1-9: 0

## 2020-04-29 ENCOUNTER — TELEPHONE (OUTPATIENT)
Dept: FAMILY MEDICINE CLINIC | Age: 65
End: 2020-04-29

## 2020-04-29 NOTE — TELEPHONE ENCOUNTER
Called to remind patient she is overdue for colonoscopy , she states she had it done at [facility] in 2017 and was told to repeat in 5 years.  patient will reach out to [facility] and have results faxed to us

## 2020-05-28 ENCOUNTER — TELEPHONE (OUTPATIENT)
Dept: FAMILY MEDICINE CLINIC | Age: 65
End: 2020-05-28

## 2020-05-28 NOTE — TELEPHONE ENCOUNTER
pt called today with:  on read end  Was offered an appointment for VV , refused appointment. Patient would like Christie Fleming MD to see in office. pharmacy confirmed in [facility].     Patient was made aware of e-visits via [employer]

## 2020-05-29 ENCOUNTER — OFFICE VISIT (OUTPATIENT)
Dept: FAMILY MEDICINE CLINIC | Age: 65
End: 2020-05-29
Payer: COMMERCIAL

## 2020-05-29 VITALS
DIASTOLIC BLOOD PRESSURE: 86 MMHG | WEIGHT: 285 LBS | HEART RATE: 90 BPM | SYSTOLIC BLOOD PRESSURE: 138 MMHG | TEMPERATURE: 98.2 F | BODY MASS INDEX: 47.48 KG/M2 | OXYGEN SATURATION: 97 %

## 2020-05-29 PROBLEM — L02.31 ABSCESS OF BUTTOCK, RIGHT: Status: ACTIVE | Noted: 2020-05-29

## 2020-05-29 PROCEDURE — 99212 OFFICE O/P EST SF 10 MIN: CPT | Performed by: NURSE PRACTITIONER

## 2020-05-29 RX ORDER — CEPHALEXIN 500 MG/1
500 CAPSULE ORAL 4 TIMES DAILY
Refills: 0 | Status: CANCELLED | OUTPATIENT
Start: 2020-05-29

## 2020-05-29 RX ORDER — SULFAMETHOXAZOLE AND TRIMETHOPRIM 800; 160 MG/1; MG/1
1 TABLET ORAL 2 TIMES DAILY
Qty: 20 TABLET | Refills: 0 | Status: SHIPPED | OUTPATIENT
Start: 2020-05-29 | End: 2020-06-08

## 2020-05-29 RX ORDER — CEPHALEXIN 500 MG/1
500 CAPSULE ORAL 3 TIMES DAILY
Qty: 30 CAPSULE | Refills: 0 | Status: SHIPPED | OUTPATIENT
Start: 2020-05-29 | End: 2020-06-08

## 2020-05-29 ASSESSMENT — ENCOUNTER SYMPTOMS
ABDOMINAL PAIN: 0
SORE THROAT: 0
CHEST TIGHTNESS: 0
DIARRHEA: 0
SHORTNESS OF BREATH: 0
COUGH: 0
NAUSEA: 0
CONSTIPATION: 0

## 2020-05-29 NOTE — PATIENT INSTRUCTIONS
Patient Education        Skin Abscess: Care Instructions  Your Care Instructions     A skin abscess is a bacterial infection that forms a pocket of pus. A boil is a kind of skin abscess. The doctor may have cut an opening in the abscess so that the pus can drain out. You may have gauze in the cut so that the abscess will stay open and keep draining. You may need antibiotics. You will need to follow up with your doctor to make sure the infection has gone away. The doctor has checked you carefully, but problems can develop later. If you notice any problems or new symptoms, get medical treatment right away. Follow-up care is a key part of your treatment and safety. Be sure to make and go to all appointments, and call your doctor if you are having problems. It's also a good idea to know your test results and keep a list of the medicines you take. How can you care for yourself at home? · Apply warm and dry compresses, a heating pad set on low, or a hot water bottle 3 or 4 times a day for pain. Keep a cloth between the heat source and your skin. · If your doctor prescribed antibiotics, take them as directed. Do not stop taking them just because you feel better. You need to take the full course of antibiotics. · Take pain medicines exactly as directed. ? If the doctor gave you a prescription medicine for pain, take it as prescribed. ? If you are not taking a prescription pain medicine, ask your doctor if you can take an over-the-counter medicine. · Keep your bandage clean and dry. Change the bandage whenever it gets wet or dirty, or at least one time a day. · If the abscess was packed with gauze:  ? Keep follow-up appointments to have the gauze changed or removed. If the doctor instructed you to remove the gauze, follow the instructions you were given for how to remove it. ? After the gauze is removed, soak the area in warm water for 15 to 20 minutes 2 times a day, until the wound closes.   When should you call for help? Call your doctor now or seek immediate medical care if:  · You have signs of worsening infection, such as:  ? Increased pain, swelling, warmth, or redness. ? Red streaks leading from the infected skin. ? Pus draining from the wound. ? A fever. Watch closely for changes in your health, and be sure to contact your doctor if:  · You do not get better as expected. Where can you learn more? Go to [email or web address] and sign in to your [employer] account. Enter F283 in the [employer] box to learn more about \"Skin Abscess: Care Instructions. \"     If you do not have an account, please click on the \"Sign Up Now\" link. Current as of: October 31, 2019               Content Version: 12.5  © 7021-0861 [employer]wise, Incorporated. Care instructions adapted under license by [facility] ([facility]). If you have questions about a medical condition or this instruction, always ask your healthcare professional. [place] [address] any warranty or liability for your use of this information.

## 2020-06-08 ENCOUNTER — TELEPHONE (OUTPATIENT)
Dept: FAMILY MEDICINE CLINIC | Age: 65
End: 2020-06-08

## 2020-06-12 ENCOUNTER — OFFICE VISIT (OUTPATIENT)
Dept: FAMILY MEDICINE CLINIC | Age: 65
End: 2020-06-12
Payer: COMMERCIAL

## 2020-06-12 VITALS
HEIGHT: 65 IN | TEMPERATURE: 97.3 F | WEIGHT: 287.6 LBS | SYSTOLIC BLOOD PRESSURE: 124 MMHG | BODY MASS INDEX: 47.92 KG/M2 | OXYGEN SATURATION: 98 % | HEART RATE: 71 BPM | DIASTOLIC BLOOD PRESSURE: 82 MMHG

## 2020-06-12 PROBLEM — R68.84 PAIN IN LOWER JAW: Status: ACTIVE | Noted: 2020-06-12

## 2020-06-12 PROBLEM — L02.91 ABSCESS: Status: ACTIVE | Noted: 2020-06-12

## 2020-06-12 PROCEDURE — 99213 OFFICE O/P EST LOW 20 MIN: CPT | Performed by: NURSE PRACTITIONER

## 2020-06-12 ASSESSMENT — ENCOUNTER SYMPTOMS
ABDOMINAL PAIN: 0
TROUBLE SWALLOWING: 0
SHORTNESS OF BREATH: 0
FACIAL SWELLING: 1
SINUS PAIN: 0
CHEST TIGHTNESS: 0
COUGH: 0
SORE THROAT: 0
SINUS PRESSURE: 0
WHEEZING: 0

## 2020-06-12 NOTE — PROGRESS NOTES
History     Tobacco Use    Smoking status: Never Smoker    Smokeless tobacco: Never Used   Substance Use Topics    Alcohol use: No    Drug use: No        Vitals:    06/12/20 1231   BP: 124/82   Pulse: 71   Temp: 97.3 °F (36.3 °C)   SpO2: 98%   Weight: 287 lb 9.6 oz (130.5 kg)   Height: 5' 4.96\" (1.65 m)     Estimated body mass index is 47.92 kg/m² as calculated from the following:    Height as of this encounter: 5' 4.96\" (1.65 m). Weight as of this encounter: 287 lb 9.6 oz (130.5 kg). Physical Exam  Constitutional:       General: She is not in acute distress. Appearance: Normal appearance. She is obese. She is not ill-appearing, toxic-appearing or diaphoretic. HENT:      Head: Normocephalic. Jaw: There is normal jaw occlusion. No trismus, tenderness, swelling, pain on movement or malocclusion. Salivary Glands: Right salivary gland is not diffusely enlarged or tender. Left salivary gland is not diffusely enlarged or tender. Right Ear: Tympanic membrane normal.      Left Ear: Tympanic membrane normal.      Nose: Nose normal.      Mouth/Throat:      Mouth: Mucous membranes are moist.      Pharynx: Oropharynx is clear. No oropharyngeal exudate or posterior oropharyngeal erythema. Cardiovascular:      Rate and Rhythm: Normal rate and regular rhythm. Pulses: Normal pulses. Heart sounds: Normal heart sounds. Pulmonary:      Effort: Pulmonary effort is normal.      Breath sounds: Normal breath sounds. Abdominal:      General: Bowel sounds are normal.   Skin:     General: Skin is warm and dry. Capillary Refill: Capillary refill takes less than 2 seconds. Neurological:      Mental Status: She is alert. Psychiatric:         Mood and Affect: Mood normal.         Behavior: Behavior normal.         Thought Content: Thought content normal.         Judgment: Judgment normal.         ASSESSMENT/PLAN:  1.  Pain in lower jaw  Unable to find reason for pain, encouraged patient to follow up with Dentist.    Concern for abscess is very low. Dentition in good condition. No abnormalities noted along jaw line. Patient states she has a regular cleaning appointment in three weeks. Encouraged patient to call dental office today to see if they may be able to see her sooner. 2. Abscess of buttock, right  Area healing well. Continue to keep area clean. Notify office if new issues arise.

## 2020-07-27 ENCOUNTER — OFFICE VISIT (OUTPATIENT)
Dept: FAMILY MEDICINE CLINIC | Age: 65
End: 2020-07-27
Payer: COMMERCIAL

## 2020-07-27 VITALS
SYSTOLIC BLOOD PRESSURE: 126 MMHG | TEMPERATURE: 97.5 F | OXYGEN SATURATION: 97 % | BODY MASS INDEX: 47.98 KG/M2 | WEIGHT: 288 LBS | HEIGHT: 65 IN | HEART RATE: 66 BPM | DIASTOLIC BLOOD PRESSURE: 80 MMHG

## 2020-07-27 PROCEDURE — 99213 OFFICE O/P EST LOW 20 MIN: CPT | Performed by: FAMILY MEDICINE

## 2020-07-27 RX ORDER — FLUTICASONE PROPIONATE 50 MCG
2 SPRAY, SUSPENSION (ML) NASAL DAILY
Qty: 1 BOTTLE | Refills: 5 | Status: SHIPPED | OUTPATIENT
Start: 2020-07-27 | End: 2020-09-24

## 2020-07-27 NOTE — PROGRESS NOTES
Lino Givens is a 63 y.o. female. HPI:  Hear for left ear chirping off and on for  aweek  Buttock abscess is better  No vertigo or hearing loss noted denies fever chills head cold  Wt Readings from Last 3 Encounters:   07/27/20 288 lb (130.6 kg)   06/12/20 287 lb 9.6 oz (130.5 kg)   05/29/20 285 lb (129.3 kg)     Meds, vitamins and allergies reviewed with Patient    ROS:  Gen:no  fever  HEENT: no cold symptoms,no sore throat. CV:  Denies chest pain or palpitations. Pulm:  Denies shortness of breath, cough. Abd:  Denies abdominal pain, nausea and vomiting. Skin: no rash    Allergies   Allergen Reactions    Levaquin [Levofloxacin In D5w] Other (See Comments)     headache       Prior to Visit Medications    Medication Sig Taking? Authorizing Provider   Cholecalciferol (VITAMIN D3) 2000 units CAPS Take 1 capsule by mouth daily Yes Historical Provider, MD       OBJECTIVE:  /80   Pulse 66   Temp 97.5 °F (36.4 °C)   Ht 5' 4.96\" (1.65 m)   Wt 288 lb (130.6 kg)   SpO2 97%   BMI 47.98 kg/m²   GEN:  in NAD, obese, wt stable  HEENT:  NCAT, TMs:normal and throat: clear  Canal clear, tm slight dull  NECK :  Supple without adenopathy. CV:  Regular rate and rhythm, S1 and S2 normal, no murmurs, clicks  PULM:  Chest is clear, no wheezing ,  symmetric air entry throughout both lung fields. NEURO: Alert and oriented ×3    ASSESSMENT/PLAN:  Encounter Diagnoses   Name Primary?     Basal cell carcinoma (BCC) of skin of other part of face Yes    Morbid obesity (HCC)     Class 3 severe obesity due to excess calories without serious comorbidity with body mass index (BMI) of 40.0 to 44.9 in adult [facility])    Tinnitus  Trial flonase, if no better try medrol dose pack  ent prn  Lose weight  Derm qd

## 2020-08-11 ENCOUNTER — TELEPHONE (OUTPATIENT)
Dept: FAMILY MEDICINE CLINIC | Age: 65
End: 2020-08-11

## 2020-08-11 NOTE — TELEPHONE ENCOUNTER
Referred her to Dr. Chadd Maya at [facility] ear nose and throat.   Phone #[phone], please send to front office

## 2020-08-11 NOTE — PROGRESS NOTES
Dhruv Sharma   1955, 63 y.o. female   [id number]       Referring Provider: Ivis Crum MD, PhD  Referral Type: In an order in [address] [place] [address]    Reason for Visit: Evaluation of suspected change in hearing, tinnitus, or balance. ADULT AUDIOLOGIC EVALUATION      Dhruv Sharma is a 63 y.o. female seen today, 8/13/2020 for an initial audiologic evaluation. Temperature screening at intake: Within normal limits    AUDIOLOGIC AND OTHER PERTINENT MEDICAL HISTORY:        Dhruv Sharma noted chirping sound in left ear, present for about 3-4 weeks, intermittent; tinnitus bilaterally, intermittent ringing in ears, not bothersome; no concerns for change in hearing; some slight dizziness at times, nothing currently; family history of hearing loss - mother with age. Dhruv Sharma denied otalgia, aural fullness, otorrhea, history of occupational/recreational noise exposure, history of head trauma, and history of ear surgery. IMPRESSIONS:       Today's results are consistent with bilateral sensorineural hearing loss. Hearing loss is significant enough to result in difficulty understanding speech in most listening environments. Discussed good communication strategies and tinnitus management strategies. ASSESSMENT AND FINDINGS:       Otoscopy revealed: \Clear ear canals bilaterally      RIGHT EAR:  Hearing Sensitivity: Mild sensorineural hearing loss  Speech Recognition Threshold: 20 dB HL  Word Recognition: Excellent (100%), based on NU-6 25-word list at 50 dBHL using recorded speech stimuli. This finding is consistent with hearing sensitivity. Tympanometry: Normal peak pressure and compliance, Type A tympanogram, consistent with normal middle ear function. LEFT EAR:  Hearing Sensitivity: Mild sensorineural hearing loss  Speech Recognition Threshold: 35 dB HL, masked  Word Recognition: Excellent (100%), based on NU-6 25-word list at 60 dBHL, masked, using recorded speech stimuli.   This finding is consistent with

## 2020-08-11 NOTE — TELEPHONE ENCOUNTER
Pt asking for a referral to see an er doctor. Drops arent helping. Who could you recommend. For chirping in ear and ringing off and on.

## 2020-08-13 ENCOUNTER — OFFICE VISIT (OUTPATIENT)
Dept: ENT CLINIC | Age: 65
End: 2020-08-13
Payer: COMMERCIAL

## 2020-08-13 ENCOUNTER — PROCEDURE VISIT (OUTPATIENT)
Dept: AUDIOLOGY | Age: 65
End: 2020-08-13
Payer: COMMERCIAL

## 2020-08-13 VITALS
TEMPERATURE: 97.8 F | WEIGHT: 286.4 LBS | HEIGHT: 65 IN | DIASTOLIC BLOOD PRESSURE: 80 MMHG | BODY MASS INDEX: 47.72 KG/M2 | HEART RATE: 69 BPM | SYSTOLIC BLOOD PRESSURE: 121 MMHG

## 2020-08-13 PROBLEM — H90.3 SENSORINEURAL HEARING LOSS, BILATERAL: Status: ACTIVE | Noted: 2020-08-13

## 2020-08-13 PROCEDURE — 92557 COMPREHENSIVE HEARING TEST: CPT | Performed by: AUDIOLOGIST

## 2020-08-13 PROCEDURE — 99203 OFFICE O/P NEW LOW 30 MIN: CPT | Performed by: OTOLARYNGOLOGY

## 2020-08-13 PROCEDURE — 92567 TYMPANOMETRY: CPT | Performed by: AUDIOLOGIST

## 2020-08-13 RX ORDER — MOMETASONE FUROATE 50 UG/1
1 SPRAY, METERED NASAL DAILY
Qty: 1 INHALER | Refills: 5 | Status: SHIPPED | OUTPATIENT
Start: 2020-08-13 | End: 2020-09-24

## 2020-08-13 ASSESSMENT — ENCOUNTER SYMPTOMS
VOICE CHANGE: 0
NAUSEA: 0
VOMITING: 0
FACIAL SWELLING: 0
COLOR CHANGE: 0
BACK PAIN: 0
SINUS PRESSURE: 0
SORE THROAT: 0
WHEEZING: 0
STRIDOR: 0
SINUS PAIN: 0
CHOKING: 0
COUGH: 0
EYE PAIN: 0
BLOOD IN STOOL: 0
TROUBLE SWALLOWING: 0
EYE DISCHARGE: 0
SHORTNESS OF BREATH: 0
CONSTIPATION: 0
RHINORRHEA: 0
CHEST TIGHTNESS: 0
DIARRHEA: 0
APNEA: 0

## 2020-08-13 NOTE — Clinical Note
Dr. Basil Clancy,    Please see note from this patient's audiogram from today. Please let me know if there is anything further you need.       Yessenia Cruz [address] [place]  Audiologist

## 2020-08-13 NOTE — PROGRESS NOTES
activity: Not Currently     Partners: Male     Comment:    Lifestyle    Physical activity     Days per week: Not on file     Minutes per session: Not on file    Stress: Not on file   Relationships    Social connections     Talks on phone: Not on file     Gets together: Not on file     Attends Jew service: Not on file     Active member of club or organization: Not on file     Attends meetings of clubs or organizations: Not on file     Relationship status: Not on file    Intimate partner violence     Fear of current or ex partner: Not on file     Emotionally abused: Not on file     Physically abused: Not on file     Forced sexual activity: Not on file   Other Topics Concern    Not on file   Social History Narrative    Not on file       DRUG/FOOD ALLERGIES: Flonase [fluticasone propionate] and Levaquin [levofloxacin in d5w]    CURRENT MEDICATIONS  Prior to Admission medications    Medication Sig Start Date End Date Taking? Authorizing Provider   Cholecalciferol (VITAMIN D3) 2000 units CAPS Take 1 capsule by mouth daily   Yes Historical Provider, MD   fluticasone (FLONASE) 50 MCG/ACT nasal spray 2 sprays by Each Nostril route daily  Patient not taking: Reported on 8/13/2020 7/27/20   Chinyere Hernandez MD       Review of Systems   Constitutional: Negative for activity change, appetite change, chills, fatigue and fever. HENT: Positive for tinnitus. Negative for congestion, dental problem, drooling, ear discharge, ear pain, facial swelling, hearing loss, mouth sores, nosebleeds, postnasal drip, rhinorrhea, sinus pressure, sinus pain, sneezing, sore throat, trouble swallowing and voice change. Eyes: Negative for pain, discharge and visual disturbance. Respiratory: Negative for apnea, cough, choking, chest tightness, shortness of breath, wheezing and stridor. Cardiovascular: Negative for palpitations. Gastrointestinal: Negative for blood in stool, constipation, diarrhea, nausea and vomiting. Endocrine: Negative for cold intolerance, heat intolerance, polydipsia, polyphagia and polyuria. Musculoskeletal: Negative for back pain, gait problem, neck pain and neck stiffness. Skin: Negative for color change, pallor, rash and wound. Allergic/Immunologic: Negative for environmental allergies, food allergies and immunocompromised state. Neurological: Negative for dizziness, facial asymmetry, speech difficulty, light-headedness, numbness and headaches. Hematological: Negative for adenopathy. Does not bruise/bleed easily. Psychiatric/Behavioral: Negative for agitation, confusion, self-injury and sleep disturbance. The patient is not nervous/anxious. Objective:   Physical Exam  Constitutional:       Appearance: She is well-developed. She is not ill-appearing. HENT:      Head: Normocephalic and atraumatic. Not macrocephalic and not microcephalic. No raccoon eyes, Hill's sign, abrasion, contusion, right periorbital erythema, left periorbital erythema or laceration. Hair is normal.      Jaw: No trismus. Salivary Glands: Right salivary gland is not diffusely enlarged. Left salivary gland is not diffusely enlarged. Right Ear: Hearing, tympanic membrane and external ear normal. No decreased hearing noted. No drainage, swelling or tenderness. No middle ear effusion. No mastoid tenderness. Tympanic membrane is not perforated, retracted or bulging. Tympanic membrane has normal mobility. Left Ear: Hearing, tympanic membrane and external ear normal. No decreased hearing noted. No drainage, swelling or tenderness. No middle ear effusion. No mastoid tenderness. Tympanic membrane is not perforated, retracted or bulging. Tympanic membrane has normal mobility. Ears:      Zayas exam findings: does not lateralize. Right Rinne: AC > BC. Left Rinne: AC > BC. Nose: No nasal deformity, septal deviation, laceration, mucosal edema or rhinorrhea. Right Nostril: No epistaxis. Left Nostril: No epistaxis. Right Turbinates: Not enlarged. Left Turbinates: Not enlarged. Right Sinus: No maxillary sinus tenderness or frontal sinus tenderness. Left Sinus: No maxillary sinus tenderness or frontal sinus tenderness. Mouth/Throat:      Lips: No lesions. Mouth: Mucous membranes are not pale, not dry and not cyanotic. No lacerations or oral lesions. Dentition: Normal dentition. No dental caries or dental abscesses. Tongue: No lesions. Palate: No mass. Pharynx: Uvula midline. No oropharyngeal exudate, posterior oropharyngeal erythema or uvula swelling. Tonsils: No tonsillar abscesses. Eyes:      General: Lids are normal. Lids are everted, no foreign bodies appreciated. Right eye: No discharge. Left eye: No discharge. Extraocular Movements:      Right eye: Normal extraocular motion and no nystagmus. Left eye: Normal extraocular motion and no nystagmus. Conjunctiva/sclera:      Right eye: No chemosis or exudate. Left eye: No chemosis or exudate. Neck:      Musculoskeletal: Neck supple. Thyroid: No thyroid mass or thyromegaly. Vascular: Normal carotid pulses. Trachea: Trachea normal. No tracheal deviation. Cardiovascular:      Rate and Rhythm: Normal rate and regular rhythm. Pulmonary:      Effort: No tachypnea, bradypnea or respiratory distress. Breath sounds: No stridor. Musculoskeletal:      Right shoulder: She exhibits normal range of motion. Left shoulder: She exhibits normal range of motion. Lymphadenopathy:      Head:      Right side of head: No submental, submandibular, tonsillar, preauricular, posterior auricular or occipital adenopathy. Left side of head: No submental, submandibular, tonsillar, preauricular, posterior auricular or occipital adenopathy. Cervical:      Right cervical: No superficial, deep or posterior cervical adenopathy.      Left cervical: No superficial, deep or posterior cervical adenopathy. Skin:     Findings: No bruising, erythema, laceration or lesion. Neurological:      Mental Status: She is alert and oriented to person, place, and time. Psychiatric:         Speech: Speech normal.         Behavior: Behavior normal.       Audio: Asymmetric hearing loss left. Mild. Normal tympps and word discrimination. Assessment:       Diagnosis Orders   1. Sensorineural hearing loss (SNHL) of left ear with unrestricted hearing of right ear  MRI IAC POSTERIOR FOSSA W WO CONTRAST   2. Tinnitus of left ear  MRI IAC POSTERIOR FOSSA W WO CONTRAST           Plan:      No evidence of pathological disease. Order MRI due to asymmetric hearing loss and ear disease as child. Trial mometasone. Flonase made her nauseous. Call with MRI results.          Carolin Pickering MD

## 2020-08-13 NOTE — PATIENT INSTRUCTIONS

## 2020-08-21 ENCOUNTER — HOSPITAL ENCOUNTER (OUTPATIENT)
Dept: MRI IMAGING | Age: 65
Discharge: HOME | End: 2020-08-21
Payer: MEDICARE

## 2020-08-21 LAB
GFR AFRICAN AMERICAN: >60
GFR NON-AFRICAN AMERICAN: >60
PERFORMED ON: NORMAL
PERFORMED ON: NORMAL
POC CREATININE: 0.9 MG/DL (ref 0.6–1.2)
POC SAMPLE TYPE: NORMAL
POC SAMPLE TYPE: NORMAL

## 2020-08-21 PROCEDURE — 70553 MRI BRAIN STEM W/O & W/DYE: CPT

## 2020-08-21 PROCEDURE — A9576 INJ PROHANCE MULTIPACK: HCPCS | Performed by: OTOLARYNGOLOGY

## 2020-08-21 PROCEDURE — 82565 ASSAY OF CREATININE: CPT

## 2020-08-21 PROCEDURE — 6360000004 HC RX CONTRAST MEDICATION: Performed by: OTOLARYNGOLOGY

## 2020-08-21 RX ADMIN — GADOTERIDOL 20 ML: 279.3 INJECTION, SOLUTION INTRAVENOUS at 18:29

## 2020-09-08 RX ORDER — FLUNISOLIDE 0.25 MG/ML
1 SOLUTION NASAL EVERY 12 HOURS
Qty: 1 BOTTLE | Refills: 5 | Status: SHIPPED | OUTPATIENT
Start: 2020-09-08 | End: 2020-09-24

## 2020-09-24 ENCOUNTER — OFFICE VISIT (OUTPATIENT)
Dept: FAMILY MEDICINE CLINIC | Age: 65
End: 2020-09-24
Payer: COMMERCIAL

## 2020-09-24 VITALS
BODY MASS INDEX: 47.43 KG/M2 | SYSTOLIC BLOOD PRESSURE: 138 MMHG | DIASTOLIC BLOOD PRESSURE: 86 MMHG | HEART RATE: 78 BPM | OXYGEN SATURATION: 98 % | WEIGHT: 285 LBS

## 2020-09-24 PROCEDURE — 99213 OFFICE O/P EST LOW 20 MIN: CPT | Performed by: NURSE PRACTITIONER

## 2020-09-24 RX ORDER — SULFAMETHOXAZOLE AND TRIMETHOPRIM 800; 160 MG/1; MG/1
1 TABLET ORAL 2 TIMES DAILY
Qty: 20 TABLET | Refills: 0 | Status: SHIPPED | OUTPATIENT
Start: 2020-09-24 | End: 2020-10-04

## 2020-09-24 NOTE — PROGRESS NOTES
SUBJECTIVE:  Pt is a of 63 y.o. female comes in today with   Chief Complaint   Patient presents with    Mass     Pt states she has bump on tailbone       Patient presenting today for evaluation of painful bump to left upper buttock. Noticed yesterday. Put rubbing alcohol on it and seems smaller today. No longer painful. States unable to see bump. Denies fevers or malaise. No drainage. No itching. Struggling with sleeping at night. Trouble falling asleep and staying asleep. Symptoms come and go. No OTC treatments. Prior to Visit Medications    Medication Sig Taking? Authorizing Provider   Cholecalciferol (VITAMIN D3) 2000 units CAPS Take 1 capsule by mouth daily Yes Historical Provider, MD         Patient's allergies, past medical, surgical, social and family histories werereviewed and updated as appropriate. Review of Systems   Constitutional: Positive for fatigue. Negative for chills and fever. Musculoskeletal: Negative for myalgias. Skin:        Painful bump to left buttock     Psychiatric/Behavioral: Positive for sleep disturbance. The patient is not nervous/anxious. Physical Exam  Vitals signs reviewed. Constitutional:       Appearance: She is well-developed. HENT:      Head: Normocephalic and atraumatic. Skin:     General: Skin is warm and dry. Comments: Left upper buttock, near gluteal fold: approx 0.5 cm area of erythema, and induration noted. No fluctuance. Non-tender           Neurological:      Mental Status: She is alert and oriented to person, place, and time. Psychiatric:         Behavior: Behavior normal.         Thought Content: Thought content normal.         Judgment: Judgment normal.       Vitals:    09/24/20 1046   BP: 138/86   Pulse: 78   SpO2: 98%   Weight: 285 lb (129.3 kg)             ASSESSMENT:  1. Folliculitis    2. Primary insomnia        PLAN:  1.  Folliculitis  Start warm compresses 3-4 x day for 10 min  If no improvement or worsening in 3-4 days,

## 2020-09-24 NOTE — PATIENT INSTRUCTIONS
Warm compresses 3-4 x day for 10 min. Use hot wash cloth. Ok to use topical alcohol twice a day   If no improvement or worsening in 3-4 days, start Bactrim      Patient Education        Skin Abscess: Care Instructions  Your Care Instructions     A skin abscess is a bacterial infection that forms a pocket of pus. A boil is a kind of skin abscess. The doctor may have cut an opening in the abscess so that the pus can drain out. You may have gauze in the cut so that the abscess will stay open and keep draining. You may need antibiotics. You will need to follow up with your doctor to make sure the infection has gone away. The doctor has checked you carefully, but problems can develop later. If you notice any problems or new symptoms, get medical treatment right away. Follow-up care is a key part of your treatment and safety. Be sure to make and go to all appointments, and call your doctor if you are having problems. It's also a good idea to know your test results and keep a list of the medicines you take. How can you care for yourself at home? · Apply warm and dry compresses, a heating pad set on low, or a hot water bottle 3 or 4 times a day for pain. Keep a cloth between the heat source and your skin. · If your doctor prescribed antibiotics, take them as directed. Do not stop taking them just because you feel better. You need to take the full course of antibiotics. · Take pain medicines exactly as directed. ? If the doctor gave you a prescription medicine for pain, take it as prescribed. ? If you are not taking a prescription pain medicine, ask your doctor if you can take an over-the-counter medicine. · Keep your bandage clean and dry. Change the bandage whenever it gets wet or dirty, or at least one time a day. · If the abscess was packed with gauze:  ? Keep follow-up appointments to have the gauze changed or removed.  If the doctor instructed you to remove the gauze, follow the instructions you were given for how to remove it. ? After the gauze is removed, soak the area in warm water for 15 to 20 minutes 2 times a day, until the wound closes. When should you call for help? Call your doctor now or seek immediate medical care if:  · You have signs of worsening infection, such as:  ? Increased pain, swelling, warmth, or redness. ? Red streaks leading from the infected skin. ? Pus draining from the wound. ? A fever. Watch closely for changes in your health, and be sure to contact your doctor if:  · You do not get better as expected. Where can you learn more? Go to https://[email or web address].Get [email or web address]. org and sign in to your [employer] account. Enter D934 in the Flock box to learn more about \"Skin Abscess: Care Instructions. \"     If you do not have an account, please click on the \"Sign Up Now\" link. Current as of: October 31, 2019               Content Version: 12.5  © 2006-2020 [employer]wise, Incorporated. Care instructions adapted under license by [facility] ([facility]). If you have questions about a medical condition or this instruction, always ask your healthcare professional. [place] [address] any warranty or liability for your use of this information.

## 2020-12-17 ENCOUNTER — HOSPITAL ENCOUNTER (OUTPATIENT)
Age: 65
Discharge: HOME | End: 2020-12-17
Payer: COMMERCIAL

## 2020-12-17 ENCOUNTER — HOSPITAL ENCOUNTER (OUTPATIENT)
Dept: GENERAL RADIOLOGY | Age: 65
Discharge: HOME | End: 2020-12-17
Payer: COMMERCIAL

## 2020-12-17 ENCOUNTER — OFFICE VISIT (OUTPATIENT)
Dept: FAMILY MEDICINE CLINIC | Age: 65
End: 2020-12-17
Payer: COMMERCIAL

## 2020-12-17 VITALS
OXYGEN SATURATION: 97 % | HEART RATE: 85 BPM | TEMPERATURE: 96.9 F | HEIGHT: 65 IN | WEIGHT: 283 LBS | BODY MASS INDEX: 47.15 KG/M2 | DIASTOLIC BLOOD PRESSURE: 90 MMHG | SYSTOLIC BLOOD PRESSURE: 148 MMHG

## 2020-12-17 PROCEDURE — 99213 OFFICE O/P EST LOW 20 MIN: CPT | Performed by: FAMILY MEDICINE

## 2020-12-17 PROCEDURE — 73560 X-RAY EXAM OF KNEE 1 OR 2: CPT

## 2020-12-17 PROCEDURE — 73502 X-RAY EXAM HIP UNI 2-3 VIEWS: CPT

## 2020-12-17 NOTE — PROGRESS NOTES
Mary Torres is a 63 y.o. female. HPI:  Jo Porras is a 59-year-old female presenting for knee pain and follow-up from her visit to Urgent Care for vertigo    Ms. Estela Augustin has been having worsening bilateral knee pain after she fell down some cement steps on 09/30. She did not hit her head or lose consciousness. The pain is achy, dull, and worse with movement. She has no pain at rest. She reports no bony tenderness to palpation. The pain does not radiate and she reports no neurologic symptoms including no numbness or tingling. She tried taking MoveFree Advance OTC supplement for a few days with no relief, however Advil has provided moderate relief. Her trip to Urgent Care was on Saturday 12/12 for a chief complaint of dizziness with a \"spinning\" sensation. She had nausea but no vomiting, and continues to have mild nausea. She was diagnosed with vertigo and discharged with meclizine which has relieved her symptoms. She had a recent Head MRI w/ contrast on July 21 at [facility] after an audiology referral for tinnitus. Her imaging to date has been normal.       Wt Readings from Last 3 Encounters:   12/17/20 283 lb (128.4 kg)   09/24/20 285 lb (129.3 kg)   08/13/20 286 lb 6.4 oz (129.9 kg)     Meds, vitamins and allergies reviewed with Patient    ROS:  Gen: no fever  HEENT: no cold symptoms, sore throat. CV:  Denies chest pain or palpitations. Pulm:  Denies shortness of breath, cough. Abd:  Denies abdominal pain, vomiting, no change in bowel habits   Skin: no rash    Allergies   Allergen Reactions    Flonase [Fluticasone Propionate] Nausea Only    Levaquin [Levofloxacin In D5w] Other (See Comments)     headache       Prior to Visit Medications    Medication Sig Taking?  Authorizing Provider   Cholecalciferol (VITAMIN D3) 2000 units CAPS Take 1 capsule by mouth daily Yes Historical Provider, MD       OBJECTIVE:  BP (!) 148/90   Pulse 85   Temp 96.9 °F (36.1 °C)   Ht 5' 5\" (1.651 m)   Wt 283 lb (128.4 kg)   SpO2 97%   BMI 47.09 kg/m²   GEN:  in NAD  HEENT:  NCAT, TMs:normal and throat: clear  NECK:  Supple without adenopathy. CV:  Regular rate and rhythm, S1 and S2 normal, no murmurs, clicks  PULM:  Chest is clear, no wheezing, symmetric air entry throughout both lung fields. EXT: No rash, edema, or deformity. Bilateral knees negative for bony tenderness, mild muscular tenderness at location of IT band. Ngative varus/valgus stress, negative anterior/posterio drawer, negative [place], negative straight leg raise. NEURO: Alert and oriented ×3, normal sensation to light touch throughout. Normal strength.      ASSESSMENT/PLAN:    Knee pain  -likely worsening osteoarthritis  -Bilateral knee XR to evaluate for stage of arthritis  -continue Advil PRN  -encourage frequent walking and quadricep strengthening exercises at home  -denied PT referral    Vertigo  -continue meclizine PRN    Insomnia  -continue melatonin 6mg PRN at night

## 2021-03-05 ENCOUNTER — HOSPITAL ENCOUNTER (OUTPATIENT)
Dept: MAMMOGRAPHY | Age: 66
Discharge: HOME | End: 2021-03-05
Payer: COMMERCIAL

## 2021-03-05 DIAGNOSIS — Z12.31 VISIT FOR SCREENING MAMMOGRAM: ICD-10-CM

## 2021-03-05 PROCEDURE — 77063 BREAST TOMOSYNTHESIS BI: CPT

## 2021-03-08 ENCOUNTER — TELEPHONE (OUTPATIENT)
Dept: FAMILY MEDICINE CLINIC | Age: 66
End: 2021-03-08

## 2021-03-08 NOTE — TELEPHONE ENCOUNTER
Impression for mammogram says for pt to repeat in 1 yr, but health maintenance has frequency as 2 yrs. Please verify and update frequency if needed.  No need to contact pt, she will discuss with Dr. Amy Paredes during physical on 4/8/12

## 2021-04-08 ENCOUNTER — OFFICE VISIT (OUTPATIENT)
Dept: FAMILY MEDICINE CLINIC | Age: 66
End: 2021-04-08
Payer: COMMERCIAL

## 2021-04-08 VITALS
DIASTOLIC BLOOD PRESSURE: 72 MMHG | HEART RATE: 77 BPM | OXYGEN SATURATION: 97 % | BODY MASS INDEX: 46.65 KG/M2 | WEIGHT: 280 LBS | TEMPERATURE: 97.1 F | SYSTOLIC BLOOD PRESSURE: 132 MMHG | HEIGHT: 65 IN

## 2021-04-08 DIAGNOSIS — Z00.00 WELL ADULT EXAM: Primary | ICD-10-CM

## 2021-04-08 PROCEDURE — 99213 OFFICE O/P EST LOW 20 MIN: CPT | Performed by: FAMILY MEDICINE

## 2021-04-08 ASSESSMENT — PATIENT HEALTH QUESTIONNAIRE - PHQ9
2. FEELING DOWN, DEPRESSED OR HOPELESS: 0
SUM OF ALL RESPONSES TO PHQ QUESTIONS 1-9: 0

## 2021-04-08 NOTE — PROGRESS NOTES
Beverly Alfredo is a 61 y.o. female. HPI:here for medical check up  Some aches and pains  Thinks she has a cataract  Needs pap  Meds, vitamins and allergies reviewed with pt    ROS: No TIA's or unusual headaches, no dysphagia. No prolonged cough. No dyspnea or chest pain on exertion. No abdominal pain, change in bowel habits, black or bloody stools. No urinary tract symptoms. No new or unusual musculoskeletal symptoms. Prior to Visit Medications    Medication Sig Taking? Authorizing Provider   Cholecalciferol (VITAMIN D3) 2000 units CAPS Take 1 capsule by mouth daily Yes Historical Provider, MD       Past Medical History:   Diagnosis Date    Abnormal Pap     Arthritis     Cancer ([facility] [address]     Mohs- NBCC right forehead    Depression     Hearing loss     Obesity     Tinnitus     Ureteral calculus     Vitamin D deficiency        Social History     Tobacco Use    Smoking status: Never Smoker    Smokeless tobacco: Never Used   Substance Use Topics    Alcohol use: No    Drug use: No       Family History   Problem Relation Age of Onset    Heart Disease Mother     Diabetes Mother     Arthritis Mother        Allergies   Allergen Reactions    Flonase [Fluticasone Propionate] Nausea Only    Levaquin [Levofloxacin In D5w] Other (See Comments)     headache       OBJECTIVE:  /72   Pulse 77   Temp 97.1 °F (36.2 °C)   Ht 5' 5\" (1.651 m)   Wt 280 lb (127 kg)   SpO2 97%   BMI 46.59 kg/m²   GEN:  in NAD,obese  NECK:  Supple without adenopathy. no bruit  CV:  Regular rate and rhythm, S1 and S2 normal, no murmurs, clicks  PULM:  Chest is clear, no wheezing ,  symmetric air entry throughout both lung fields. EXT: No rash or edema  NEURO: nonfocal    ASSESSMENT/PLAN:  oa  Obesity  Cataract    covid hesitant, urged her to get shot  rto pap.   Labs ordered  May need cat surgery, f/u with ortho

## 2021-04-09 DIAGNOSIS — Z00.00 WELL ADULT EXAM: ICD-10-CM

## 2021-04-09 LAB
A/G RATIO: 1.8 (ref 1.1–2.2)
ALBUMIN SERPL-MCNC: 4.3 G/DL (ref 3.4–5)
ALP BLD-CCNC: 65 U/L (ref 40–129)
ALT SERPL-CCNC: 11 U/L (ref 10–40)
ANION GAP SERPL CALCULATED.3IONS-SCNC: 8 MMOL/L (ref 3–16)
AST SERPL-CCNC: 16 U/L (ref 15–37)
BASOPHILS ABSOLUTE: 0 K/UL (ref 0–0.2)
BASOPHILS RELATIVE PERCENT: 0.5 %
BILIRUB SERPL-MCNC: 0.5 MG/DL (ref 0–1)
BUN BLDV-MCNC: 12 MG/DL (ref 7–20)
CALCIUM SERPL-MCNC: 9.2 MG/DL (ref 8.3–10.6)
CHLORIDE BLD-SCNC: 103 MMOL/L (ref 99–110)
CHOLESTEROL, FASTING: 157 MG/DL (ref 0–199)
CO2: 27 MMOL/L (ref 21–32)
CREAT SERPL-MCNC: 0.7 MG/DL (ref 0.6–1.2)
EOSINOPHILS ABSOLUTE: 0.1 K/UL (ref 0–0.6)
EOSINOPHILS RELATIVE PERCENT: 1.1 %
GFR AFRICAN AMERICAN: >60
GFR NON-AFRICAN AMERICAN: >60
GLOBULIN: 2.4 G/DL
GLUCOSE FASTING: 124 MG/DL (ref 70–99)
HCT VFR BLD CALC: 42.6 % (ref 36–48)
HDLC SERPL-MCNC: 42 MG/DL (ref 40–60)
HEMOGLOBIN: 14.5 G/DL (ref 12–16)
LDL CHOLESTEROL CALCULATED: 97 MG/DL
LYMPHOCYTES ABSOLUTE: 1.9 K/UL (ref 1–5.1)
LYMPHOCYTES RELATIVE PERCENT: 26.7 %
MCH RBC QN AUTO: 33.2 PG (ref 26–34)
MCHC RBC AUTO-ENTMCNC: 34.1 G/DL (ref 31–36)
MCV RBC AUTO: 97.4 FL (ref 80–100)
MONOCYTES ABSOLUTE: 0.5 K/UL (ref 0–1.3)
MONOCYTES RELATIVE PERCENT: 6.7 %
NEUTROPHILS ABSOLUTE: 4.7 K/UL (ref 1.7–7.7)
NEUTROPHILS RELATIVE PERCENT: 65 %
PDW BLD-RTO: 13.3 % (ref 12.4–15.4)
PLATELET # BLD: 301 K/UL (ref 135–450)
PMV BLD AUTO: 8.6 FL (ref 5–10.5)
POTASSIUM SERPL-SCNC: 4.2 MMOL/L (ref 3.5–5.1)
RBC # BLD: 4.38 M/UL (ref 4–5.2)
SODIUM BLD-SCNC: 138 MMOL/L (ref 136–145)
TOTAL PROTEIN: 6.7 G/DL (ref 6.4–8.2)
TRIGLYCERIDE, FASTING: 88 MG/DL (ref 0–150)
TSH REFLEX: 2.7 UIU/ML (ref 0.27–4.2)
VLDLC SERPL CALC-MCNC: 18 MG/DL
WBC # BLD: 7.2 K/UL (ref 4–11)

## 2021-04-10 ENCOUNTER — IMMUNIZATION (OUTPATIENT)
Dept: FAMILY MEDICINE CLINIC | Age: 66
End: 2021-04-10
Payer: COMMERCIAL

## 2021-04-10 PROCEDURE — 0001A COVID-19, PFIZER VACCINE 30MCG/0.3ML DOSE: CPT | Performed by: FAMILY MEDICINE

## 2021-04-10 PROCEDURE — 91300 COVID-19, PFIZER VACCINE 30MCG/0.3ML DOSE: CPT | Performed by: FAMILY MEDICINE

## 2021-04-12 ENCOUNTER — TELEPHONE (OUTPATIENT)
Dept: FAMILY MEDICINE CLINIC | Age: 66
End: 2021-04-12

## 2021-05-01 ENCOUNTER — IMMUNIZATION (OUTPATIENT)
Dept: FAMILY MEDICINE CLINIC | Age: 66
End: 2021-05-01
Payer: COMMERCIAL

## 2021-05-01 PROCEDURE — 0002A COVID-19, PFIZER VACCINE 30MCG/0.3ML DOSE: CPT | Performed by: FAMILY MEDICINE

## 2021-05-01 PROCEDURE — 91300 COVID-19, PFIZER VACCINE 30MCG/0.3ML DOSE: CPT | Performed by: FAMILY MEDICINE

## 2021-05-04 ENCOUNTER — OFFICE VISIT (OUTPATIENT)
Dept: FAMILY MEDICINE CLINIC | Age: 66
End: 2021-05-04
Payer: COMMERCIAL

## 2021-05-04 VITALS
HEIGHT: 65 IN | OXYGEN SATURATION: 98 % | SYSTOLIC BLOOD PRESSURE: 118 MMHG | DIASTOLIC BLOOD PRESSURE: 76 MMHG | TEMPERATURE: 97.1 F | BODY MASS INDEX: 46.82 KG/M2 | WEIGHT: 281 LBS | HEART RATE: 80 BPM

## 2021-05-04 DIAGNOSIS — R73.09 ELEVATED GLUCOSE: ICD-10-CM

## 2021-05-04 DIAGNOSIS — Z01.818 PREOP EXAMINATION: ICD-10-CM

## 2021-05-04 DIAGNOSIS — H25.89 OTHER AGE-RELATED CATARACT OF BOTH EYES: Primary | ICD-10-CM

## 2021-05-04 PROCEDURE — 99243 OFF/OP CNSLTJ NEW/EST LOW 30: CPT | Performed by: FAMILY MEDICINE

## 2021-05-04 NOTE — PROGRESS NOTES
[facility] Medicine Preoperative Evaluation       Karin Najera M.D.     [address] [facility], [address]     (phone) [phone]       (fax) [phone]    Dear Dr. Laien Crook,         Thank you for referring Sid Morales to me for Preoperative Evaluation for right cataract surgery then left cataract surgery. Below are the relevant portions of my assessment and plan of care. Sid Morales    61 y.o.   1955    [address]    Vitals:    05/04/21 1131   BP: 118/76   Pulse: 80   Temp: 97.1 °F (36.2 °C)   SpO2: 98%   Weight: 281 lb (127.5 kg)   Height: 5' 5\" (1.651 m)      Wt Readings from Last 2 Encounters:   05/04/21 281 lb (127.5 kg)   04/08/21 280 lb (127 kg)     BP Readings from Last 3 Encounters:   05/04/21 118/76   04/08/21 132/72   12/17/20 (!) 148/90        Allergies   Allergen Reactions    Flonase [Fluticasone Propionate] Nausea Only    Levaquin [Levofloxacin In D5w] Other (See Comments)     headache     Current Outpatient Medications   Medication Sig Dispense Refill    Cholecalciferol (VITAMIN D3) 2000 units CAPS Take 1 capsule by mouth daily       No current facility-administered medications for this visit. She presents to the office today for a preoperative consultation at the request of surgeon, Dr. Tatiana Novak  who plans on performing right cataract surgery on May 10 , 2021 and left cataract surgery 6/7/2021. The current problem began 2 years ago and has worsened. Conservative therapy, including surveillance , has failed. Planned anesthesia is Topical anesthesia. The patient has no known anesthesia issues. Patient has no bleeding risk.   No loose teeth or dentures      Past Medical History:   Diagnosis Date    Abnormal Pap     Arthritis     Cancer ([address]     Mohs- NBCC right forehead    Depression     Hearing loss     Obesity     Tinnitus     Ureteral calculus     Vitamin D deficiency      Past Surgical History: Procedure Laterality Date    CERVIX LESION DESTRUCTION      for abnl pap    TONSILLECTOMY       Family History is not significant for reactions to anesthesia  Social History     Socioeconomic History    Marital status:      Spouse name: Martha Landis Number of children: 0    Years of education: Not on file    Highest education level: Not on file   Occupational History    Not on file   Social Needs    Financial resource strain: Not on file    Food insecurity     Worry: Not on file     Inability: Not on file    Transportation needs     Medical: Not on file     Non-medical: Not on file   Tobacco Use    Smoking status: Never Smoker    Smokeless tobacco: Never Used   Substance and Sexual Activity    Alcohol use: No    Drug use: No    Sexual activity: Not Currently     Partners: Male     Comment:    Lifestyle    Physical activity     Days per week: Not on file     Minutes per session: Not on file    Stress: Not on file   Relationships    Social connections     Talks on phone: Not on file     Gets together: Not on file     Attends Sikh service: Not on file     Active member of club or organization: Not on file     Attends meetings of clubs or organizations: Not on file     Relationship status: Not on file    Intimate partner violence     Fear of current or ex partner: Not on file     Emotionally abused: Not on file     Physically abused: Not on file     Forced sexual activity: Not on file   Other Topics Concern    Not on file   Social History Narrative    Not on file       REVIEW OF SYSTEMS:   CONSTITUTIONAL: No major weight gain or loss, fatigue, weakness, night sweats or fever. HEENT: Bilateral cataracts that are bothering her, no hearing difficulty  RESPIRATORY: No new SOB, PND, orthopnea or cough. CARDIOVASCULAR: no CP, palpitations or SOB with exertion  GI: No nausea, vomiting, diarrhea, constipation, abdominal pain or changes in bowel habits.    : No urinary frequency, urgency, incontinence hematuria or dysuria. SKIN: No cyanosis or skin lesions. MUSCULOSKELETAL: No new muscle or joint pain. NEUROLOGICAL: No syncope or TIA-like symptoms. PSYCHIATRIC: No anxiety, insomnia or depression     Physical Exam   /76   Pulse 80   Temp 97.1 °F (36.2 °C)   Ht 5' 5\" (1.651 m)   Wt 281 lb (127.5 kg)   SpO2 98%   BMI 46.76 kg/m²   Constitutional: Patient is oriented to person, place, and time. She appears in no distress. Elevated BMI  Head: Normocephalic and atraumatic. Right Ear: Tympanic membrane, external ear and ear canal normal.   Left Ear: Tympanic membrane, external ear and ear canal normal.   Nose: Nose normal.   Mouth/Throat: Not examined due to Covid/mask there is no cervical adenopathy. There are no loose teeth. Eyes: Conjunctivae and extraocular motions are normal. Pupils are equal, round, and reactive to light bilaterally. Bilateral cataracts  Neck: Neck supple. No JVD present. No mass and no thyromegaly present. Cardiovascular: Normal rate, regular rhythm, normal heart sounds and intact distal pulses. Exam reveals no gallop and no friction rub. No murmur heard. Pulmonary/Chest: Effort normal and breath sounds normal. No respiratory distress. There are no wheezes, rhonchi or rales. Abdominal: Soft, non-tender, obese, normal bowel sounds and aorta. There is no organomegaly, bruit or palpable mass. Neurological: She is alert and oriented to person, place, and time. She has normal reflexes. No cranial nerve deficit. Coordination normal.   Skin: Skin is warm and dry. There is no rash or erythema. No suspicious lesions noted. Psychiatric: She has a normal mood and affect.  Speech and behavior are normal. Judgment, cognition and memory are normal.     EKG Interpretation: not performed   Comparison to prior EKG: not performed      Lab Results   Component Value Date    WBC 7.2 04/09/2021    HGB 14.5 04/09/2021    HCT 42.6 04/09/2021    MCV 97.4 04/09/2021     04/09/2021     Lab Results   Component Value Date     04/09/2021    K 4.2 04/09/2021     04/09/2021    CO2 27 04/09/2021     Lab Results   Component Value Date    CREATININE 0.7 04/09/2021     Blood sugar is 124/fast    Assessment:  Encounter Diagnoses   Name Primary?  Other age-related cataract of both eyes Yes    Preop examination     Elevated glucose         61 y.o. patient with planned surgery as above. Known risk factors for perioperative complications: None        Plan:    1. Preoperative workup as follows: none. 2. Change in medication regimen before surgery: discontinue NSAIDs (asa) 7 d before surgery. 3. Prophylaxis for cardiac events with perioperative beta-blockers: not indicated. 4. Invasive hemodynamic monitoring perioperatively: not indicated. 5. Patient is cleared for upcoming surgery. If you have questions, please do not hesitate to call me. Sincerely,        Poppy Cuenca.  Guillermo Peoples M.D.

## 2021-06-02 ENCOUNTER — OFFICE VISIT (OUTPATIENT)
Dept: FAMILY MEDICINE CLINIC | Age: 66
End: 2021-06-02
Payer: COMMERCIAL

## 2021-06-02 VITALS
DIASTOLIC BLOOD PRESSURE: 71 MMHG | HEIGHT: 65 IN | HEART RATE: 51 BPM | BODY MASS INDEX: 47.15 KG/M2 | OXYGEN SATURATION: 98 % | SYSTOLIC BLOOD PRESSURE: 118 MMHG | WEIGHT: 283 LBS

## 2021-06-02 DIAGNOSIS — N18.5 CHRONIC KIDNEY DISEASE, STAGE 5 (HCC): ICD-10-CM

## 2021-06-02 DIAGNOSIS — Z01.419 WELL WOMAN EXAM: ICD-10-CM

## 2021-06-02 DIAGNOSIS — C44.319 BASAL CELL CARCINOMA (BCC) OF SKIN OF OTHER PART OF FACE: Primary | ICD-10-CM

## 2021-06-02 DIAGNOSIS — E66.01 CLASS 3 SEVERE OBESITY DUE TO EXCESS CALORIES WITHOUT SERIOUS COMORBIDITY WITH BODY MASS INDEX (BMI) OF 40.0 TO 44.9 IN ADULT (HCC): ICD-10-CM

## 2021-06-02 DIAGNOSIS — R73.03 PRE-DIABETES: ICD-10-CM

## 2021-06-02 DIAGNOSIS — E55.9 VITAMIN D DEFICIENCY: ICD-10-CM

## 2021-06-02 LAB
BILIRUBIN, POC: NORMAL
BLOOD URINE, POC: NORMAL
CLARITY, POC: NORMAL
COLOR, POC: YELLOW
CREATININE URINE POCT: 300
GLUCOSE URINE, POC: NORMAL
HBA1C MFR BLD: 5.9 %
KETONES, POC: NORMAL
LEUKOCYTE EST, POC: NORMAL
MICROALBUMIN/CREAT 24H UR: 10 MG/G{CREAT}
MICROALBUMIN/CREAT UR-RTO: 30
NITRITE, POC: NORMAL
PH, POC: 5
PROTEIN, POC: NORMAL
SPECIFIC GRAVITY, POC: 1.02
UROBILINOGEN, POC: 0.2

## 2021-06-02 PROCEDURE — 99214 OFFICE O/P EST MOD 30 MIN: CPT | Performed by: FAMILY MEDICINE

## 2021-06-02 PROCEDURE — 82044 UR ALBUMIN SEMIQUANTITATIVE: CPT | Performed by: FAMILY MEDICINE

## 2021-06-02 PROCEDURE — 83036 HEMOGLOBIN GLYCOSYLATED A1C: CPT | Performed by: FAMILY MEDICINE

## 2021-06-02 SDOH — ECONOMIC STABILITY: FOOD INSECURITY: WITHIN THE PAST 12 MONTHS, THE FOOD YOU BOUGHT JUST DIDN'T LAST AND YOU DIDN'T HAVE MONEY TO GET MORE.: NEVER TRUE

## 2021-06-02 SDOH — ECONOMIC STABILITY: FOOD INSECURITY: WITHIN THE PAST 12 MONTHS, YOU WORRIED THAT YOUR FOOD WOULD RUN OUT BEFORE YOU GOT MONEY TO BUY MORE.: NEVER TRUE

## 2021-06-02 ASSESSMENT — SOCIAL DETERMINANTS OF HEALTH (SDOH): HOW HARD IS IT FOR YOU TO PAY FOR THE VERY BASICS LIKE FOOD, HOUSING, MEDICAL CARE, AND HEATING?: NOT HARD AT ALL

## 2021-06-02 NOTE — PROGRESS NOTES
SUBJECTIVE: , LMP 20 years ago  61 y.o. female for annual routine Pap and checkup.sees derm, had OD cat, next OS cat  Has a boyfriend but not very sexually active  Has some vertigo prn  occ warmth in chest  Frequent urination  Mild tinea pedis  Current Outpatient Medications   Medication Sig Dispense Refill    Cholecalciferol (VITAMIN D3) 2000 units CAPS Take 1 capsule by mouth daily       No current facility-administered medications for this visit. Allergies: Flonase [fluticasone propionate] and Levaquin [levofloxacin in d5w]   No LMP recorded. Patient is postmenopausal.    ROS:  Feeling well. No dyspnea or chest pain on exertion. No abdominal pain, change in bowel habits, black or bloody stools. No urinary tract symptoms. GYN ROS: no breast pain or new or enlarging lumps on self exam, no vaginal bleeding, no hot flashes. No neurological complaints. OBJECTIVE:   The patient appears well, alert, oriented x 3, in no distress. /71   Pulse 51   Ht 5' 5\" (1.651 m)   Wt 283 lb (128.4 kg)   SpO2 98%   BMI 47.09 kg/m²   ENT normal.  Neck supple. No adenopathy or thyromegaly. SARAH. Lungs are clear, good air entry, no wheezes, rhonchi or rales. S1 and S2 normal, no murmurs, regular rate and rhythm. Abdomen soft without tenderness, guarding, mass or organomegaly. Extremities show no edema, normal peripheral pulses. Neurological is normal, no focal findings.     BREAST EXAM: For    PELVIC EXAM: normal external genitalia, vulva, vagina, cervix, uterus and adnexa  Unable to reach the cervix for Pap smear did blind vaginal cuff swab  a1c - 5.8  Lab Results   Component Value Date     2021    K 4.2 2021     2021    CO2 27 2021    BUN 12 2021    CREATININE 0.7 2021    GLUCOSE 115 (H) 2019    CALCIUM 9.2 2021    PROT 6.7 2021    LABALBU 4.3 2021    BILITOT 0.5 2021    ALKPHOS 65 2021    AST 16 2021    ALT 11 2021

## 2021-10-05 ENCOUNTER — OFFICE VISIT (OUTPATIENT)
Dept: FAMILY MEDICINE CLINIC | Age: 66
End: 2021-10-05
Payer: COMMERCIAL

## 2021-10-05 VITALS
HEART RATE: 67 BPM | WEIGHT: 273 LBS | SYSTOLIC BLOOD PRESSURE: 121 MMHG | TEMPERATURE: 96.9 F | DIASTOLIC BLOOD PRESSURE: 80 MMHG | BODY MASS INDEX: 45.43 KG/M2 | OXYGEN SATURATION: 99 %

## 2021-10-05 DIAGNOSIS — R10.9 ABDOMINAL DISCOMFORT: Primary | ICD-10-CM

## 2021-10-05 DIAGNOSIS — R73.09 ELEVATED HEMOGLOBIN A1C: ICD-10-CM

## 2021-10-05 LAB
BILIRUBIN, POC: NORMAL
BLOOD URINE, POC: NORMAL
CLARITY, POC: CLEAR
COLOR, POC: YELLOW
GLUCOSE URINE, POC: NORMAL
KETONES, POC: NORMAL
LEUKOCYTE EST, POC: NORMAL
NITRITE, POC: NORMAL
PH, POC: 7
PROTEIN, POC: NORMAL
SPECIFIC GRAVITY, POC: 1.02
UROBILINOGEN, POC: 0.2

## 2021-10-05 PROCEDURE — 99213 OFFICE O/P EST LOW 20 MIN: CPT | Performed by: FAMILY MEDICINE

## 2021-10-05 NOTE — PROGRESS NOTES
Diego Andujar is a 61 y.o. female. HPI:  Mild left mid abd discomfort     Meds, vitamins and allergies reviewed with pt  Wt Readings from Last 3 Encounters:   10/05/21 273 lb (123.8 kg)   06/02/21 283 lb (128.4 kg)   05/04/21 281 lb (127.5 kg)       REVIEW OF SYSTEMS:   CONSTITUTIONAL: See history of present illness,   Weight noted   HEENT: No new vision difficulties or ringing in the ears. RESPIRATORY: No new SOB, PND, orthopnea or cough. CARDIOVASCULAR: no CP, palpitations or SOB with exertion  GI: No nausea, vomiting, diarrhea, constipation, abdominal pain or changes in bowel habits. : No urinary frequency, urgency, incontinence hematuria or dysuria. SKIN: No cyanosis or skin lesions. MUSCULOSKELETAL: No new muscle or joint pain. NEUROLOGICAL: No syncope or TIA-like symptoms. PSYCHIATRIC: No anxiety, insomnia or depression     Allergies   Allergen Reactions    Flonase [Fluticasone Propionate] Nausea Only    Levaquin [Levofloxacin In D5w] Other (See Comments)     headache       Prior to Visit Medications    Medication Sig Taking? Authorizing Provider   Cholecalciferol (VITAMIN D3) 2000 units CAPS Take 1 capsule by mouth daily Yes Historical Provider, MD       Past Medical History:   Diagnosis Date    Abnormal Pap     Arthritis     [address] [address]     Mohs- NBCC right forehead    Depression     Hearing loss     Obesity     Tinnitus     Ureteral calculus     Vitamin D deficiency        Social History     Tobacco Use    Smoking status: Never Smoker    Smokeless tobacco: Never Used   Substance Use Topics    Alcohol use: No       Family History   Problem Relation Age of Onset    Heart Disease Mother     Diabetes Mother     Arthritis Mother        OBJECTIVE:  /80   Pulse 67   Temp 96.9 °F (36.1 °C)   Wt 273 lb (123.8 kg)   SpO2 99%   BMI 45.43 kg/m²   GEN:  in NAD  HEENT:  NCAT, TMs:normal and throat: not examined due to Covid/mask on  NECK:  Supple without adenopathy.   CV: Regular rate and rhythm, S1 and S2 normal, no murmurs, clicks  PULM:  Chest is clear, no wheezing ,  symmetric air entry throughout both lung fields. ABD: Soft, NT obese, no masses, very small pea size superficial lipoma left mid abdomen, slightly tender to palpation, no redness or warmth  EXT: No rash or edema  NEURO: Alert oriented ×3, nonfocal, no assistive device    Urine dip reviewed/normal    ASSESSMENT/PLAN:  1. Abdominal discomfort  Reassured her that nothing acute    2. Elevated= A1c  Fasting glucose and A1c today    3.   BMI= 45  Healthy diet, regular exercise and continued weight loss encouraged      20 Total Minutes spent pre charting (reviewing problem list, meds, any test results, consultant and hospital notes, health maintenance) and  obtaining present visit history, performing appropriate medical exam/evaluation, counseling and educating the patient (and family), ordering medications ,tests, and procedures as needed, refilling medication(s), placing referral(s) when needed in addition to coordinating care for this patient and documenting in electronic health record     Recommend flu vaccine and new shingles vaccine in the future

## 2022-01-27 ENCOUNTER — OFFICE VISIT (OUTPATIENT)
Dept: FAMILY MEDICINE CLINIC | Age: 67
End: 2022-01-27
Payer: MEDICARE

## 2022-01-27 VITALS
OXYGEN SATURATION: 99 % | DIASTOLIC BLOOD PRESSURE: 82 MMHG | BODY MASS INDEX: 46.65 KG/M2 | HEART RATE: 58 BPM | SYSTOLIC BLOOD PRESSURE: 126 MMHG | HEIGHT: 65 IN | WEIGHT: 280 LBS

## 2022-01-27 DIAGNOSIS — Z12.31 ENCOUNTER FOR SCREENING MAMMOGRAM FOR MALIGNANT NEOPLASM OF BREAST: ICD-10-CM

## 2022-01-27 DIAGNOSIS — Z78.0 MENOPAUSE: ICD-10-CM

## 2022-01-27 DIAGNOSIS — N18.5 CHRONIC KIDNEY DISEASE, STAGE 5 (HCC): ICD-10-CM

## 2022-01-27 DIAGNOSIS — E66.01 CLASS 3 SEVERE OBESITY DUE TO EXCESS CALORIES WITHOUT SERIOUS COMORBIDITY WITH BODY MASS INDEX (BMI) OF 40.0 TO 44.9 IN ADULT (HCC): Primary | ICD-10-CM

## 2022-01-27 PROCEDURE — 99213 OFFICE O/P EST LOW 20 MIN: CPT | Performed by: FAMILY MEDICINE

## 2022-01-27 NOTE — PROGRESS NOTES
Colletta Eden is a 74 y.o. female. HPI  Patient is complaining of vague headaches feeling \"funky\" for the last 2 days although it is improving slightly now that she is getting some sleep  She stays up late sleeps and has erratic sleep schedules  No visual disturbance  No recent head injury  No focal neurologic deficit or speech impediment  Wt Readings from Last 3 Encounters:   01/27/22 280 lb (127 kg)   10/05/21 273 lb (123.8 kg)   06/02/21 283 lb (128.4 kg)     Meds, vitamins and allergies reviewed with Patient    ROS:  Gen: no fever  HEENTno:  cold symptoms, sore throat. CV:  Denies chest pain or palpitations. Pulm:  Denies shortness of breath, cough. Abd:  Denies abdominal pain, nausea and vomiting. Skin: no rash    Allergies   Allergen Reactions    Flonase [Fluticasone Propionate] Nausea Only    Levaquin [Levofloxacin In D5w] Other (See Comments)     headache       Prior to Visit Medications    Medication Sig Taking? Authorizing Provider   Cholecalciferol (VITAMIN D3) 2000 units CAPS Take 1 capsule by mouth daily Yes Historical Provider, MD       OBJECTIVE:  /82   Pulse 58   Ht 5' 5\" (1.651 m)   Wt 280 lb (127 kg)   SpO2 99%   BMI 46.59 kg/m²   GEN:  in NAD, obese  HEENT:  NCAT, TMs:normal and throat: clear  NECK:  Supple without adenopathy. CV:  Regular rate and rhythm, S1 and S2 normal, no murmurs, clicks  PULM:  Chest is clear, no wheezing ,  symmetric air entry throughout both lung fields. NEURO: Alert and oriented ×3  Cranial nerves II through XII intact. Visual fields grossly intact,  ASSESSMENT/PLAN:  Encounter Diagnoses   Name Primary?     Class 3 severe obesity due to excess calories without serious comorbidity with body mass index (BMI) of 40.0 to 44.9 in adult [facility]) Yes          #1 exhaustion / vague headache  If sleep, Tylenol or Advil as needed, warning signs discussed    #2 IMM/HM - declines covid booster  DEXA/ MAMMOGRAM AFTER 3-4    #3 h/o skin cancer  See derm,   Avoid sunburn

## 2022-03-14 ENCOUNTER — HOSPITAL ENCOUNTER (OUTPATIENT)
Dept: GENERAL RADIOLOGY | Age: 67
Discharge: HOME | End: 2022-03-14
Payer: MEDICARE

## 2022-03-14 ENCOUNTER — HOSPITAL ENCOUNTER (OUTPATIENT)
Dept: MAMMOGRAPHY | Age: 67
Discharge: HOME | End: 2022-03-14
Payer: MEDICARE

## 2022-03-14 DIAGNOSIS — Z12.31 ENCOUNTER FOR SCREENING MAMMOGRAM FOR MALIGNANT NEOPLASM OF BREAST: ICD-10-CM

## 2022-03-14 DIAGNOSIS — Z78.0 MENOPAUSE: ICD-10-CM

## 2022-03-14 PROCEDURE — 77067 SCR MAMMO BI INCL CAD: CPT

## 2022-03-14 PROCEDURE — 77080 DXA BONE DENSITY AXIAL: CPT

## 2022-05-09 ENCOUNTER — TELEPHONE (OUTPATIENT)
Dept: FAMILY MEDICINE CLINIC | Age: 67
End: 2022-05-09

## 2022-05-09 ENCOUNTER — TELEMEDICINE (OUTPATIENT)
Dept: FAMILY MEDICINE CLINIC | Age: 67
End: 2022-05-09
Payer: MEDICARE

## 2022-05-09 DIAGNOSIS — R09.82 PND (POST-NASAL DRIP): Primary | ICD-10-CM

## 2022-05-09 DIAGNOSIS — J06.9 PROTRACTED URI: ICD-10-CM

## 2022-05-09 DIAGNOSIS — J02.9 THROAT SORENESS: ICD-10-CM

## 2022-05-09 PROCEDURE — G8427 DOCREV CUR MEDS BY ELIG CLIN: HCPCS | Performed by: FAMILY MEDICINE

## 2022-05-09 PROCEDURE — 1090F PRES/ABSN URINE INCON ASSESS: CPT | Performed by: FAMILY MEDICINE

## 2022-05-09 PROCEDURE — 1123F ACP DISCUSS/DSCN MKR DOCD: CPT | Performed by: FAMILY MEDICINE

## 2022-05-09 PROCEDURE — G8417 CALC BMI ABV UP PARAM F/U: HCPCS | Performed by: FAMILY MEDICINE

## 2022-05-09 PROCEDURE — G8399 PT W/DXA RESULTS DOCUMENT: HCPCS | Performed by: FAMILY MEDICINE

## 2022-05-09 PROCEDURE — 1036F TOBACCO NON-USER: CPT | Performed by: FAMILY MEDICINE

## 2022-05-09 PROCEDURE — 99213 OFFICE O/P EST LOW 20 MIN: CPT | Performed by: FAMILY MEDICINE

## 2022-05-09 PROCEDURE — 3017F COLORECTAL CA SCREEN DOC REV: CPT | Performed by: FAMILY MEDICINE

## 2022-05-09 PROCEDURE — 4040F PNEUMOC VAC/ADMIN/RCVD: CPT | Performed by: FAMILY MEDICINE

## 2022-05-09 RX ORDER — AZELASTINE 1 MG/ML
1 SPRAY, METERED NASAL 2 TIMES DAILY
Qty: 30 ML | Refills: 0 | Status: SHIPPED | OUTPATIENT
Start: 2022-05-09 | End: 2022-06-13

## 2022-05-09 RX ORDER — AZITHROMYCIN 250 MG/1
250 TABLET, FILM COATED ORAL SEE ADMIN INSTRUCTIONS
Qty: 6 TABLET | Refills: 0 | Status: SHIPPED | OUTPATIENT
Start: 2022-05-09 | End: 2022-05-14

## 2022-05-09 RX ORDER — LORATADINE 10 MG/1
10 TABLET ORAL DAILY
Qty: 30 TABLET | Refills: 0 | Status: SHIPPED | OUTPATIENT
Start: 2022-05-09 | End: 2022-06-13

## 2022-05-09 NOTE — TELEPHONE ENCOUNTER
Office has been notified that pt is requiring Prior Authorization for the following medication:  loratadine (CLARITIN) 10 MG tablet     --     Please initiate this request through CoverMyMeds, contacting the following Payor/Insurance:  -Medicare-     Please see below, or the documentation attached to this encounter for any additional information that may assist in processing PA:  (R09.82)--     Thank you!

## 2022-05-09 NOTE — PROGRESS NOTES
Jeanette Jeffries is a 74 y.o. female. HPI:  Jeanette Jeffries, was evaluated through a synchronous (real-time) audio-video encounter. The patient (or guardian if applicable) is aware that this is a billable service, which includes applicable co-pays. This Virtual Visit was conducted with patient's (and/or legal guardian's) consent. The visit was conducted pursuant to the emergency declaration under the [facility], [address] [place] [address] authority and the [employer] Act. Patient identification was verified, and a caregiver was present when appropriate. The patient was located in a state where the provider was licensed to provide care. --Olivia Guerrero MD on 5/9/2022 at 3:21 PM    An electronic signature was used to authenticate this note. Last week began with with URI/PND  Still with dry scratchy throat postnasal drip and cough    No fever , postnasal drip is bothering her, possible low-grade fever  Has not really tried much of anything to help    Does not feel this is covid     ? Allergies       Wt Readings from Last 3 Encounters:   01/27/22 280 lb (127 kg)   10/05/21 273 lb (123.8 kg)   06/02/21 283 lb (128.4 kg)       REVIEW OF SYSTEMS:   CONSTITUTIONAL: See history of present illness,   Weight noted   HEENT: No new vision difficulties or ringing in the ears. See HPI  RESPIRATORY: No new SOB, PND, orthopnea or cough. See HPI  CARDIOVASCULAR: no CP, palpitations or SOB with exertion  GI: No nausea, vomiting, diarrhea, constipation, abdominal pain or changes in bowel habits. : No urinary frequency, urgency, incontinence hematuria or dysuria. SKIN: No cyanosis or skin lesions. MUSCULOSKELETAL: No new muscle or joint pain. NEUROLOGICAL: No syncope or TIA-like symptoms.    PSYCHIATRIC: No anxiety, insomnia or depression     Allergies   Allergen Reactions    Flonase [Fluticasone Propionate] Nausea Only    Levaquin [Levofloxacin In D5w] Other (See Comments)     headache       Prior to Visit Medications    Medication Sig Taking? Authorizing Provider   azithromycin (ZITHROMAX) 250 MG tablet Take 1 tablet by mouth See Admin Instructions for 5 days 500mg on day 1 followed by 250mg on days 2 - 5 Yes Cecy Moncada MD   azelastine (ASTELIN) 0.1 % nasal spray 1 spray by Nasal route 2 times daily Use in each nostril as directed Yes Cecy Moncada MD   loratadine (CLARITIN) 10 MG tablet Take 1 tablet by mouth daily Yes Cecy Moncada MD   Cholecalciferol (VITAMIN D3) 2000 units CAPS Take 1 capsule by mouth daily Yes Historical Provider, MD       Past Medical History:   Diagnosis Date    Abnormal Pap     Arthritis     Cancer ([facility] [address]     Mohs- NBCC right forehead    Depression     Hearing loss     Obesity     Tinnitus     Ureteral calculus     Vitamin D deficiency        Social History     Tobacco Use    Smoking status: Never Smoker    Smokeless tobacco: Never Used   Substance Use Topics    Alcohol use: No       Family History   Problem Relation Age of Onset    Heart Disease Mother     Diabetes Mother     Arthritis Mother        OBJECTIVE:  There were no vitals taken for this visit. GEN:  alert and pleasant , hoarse and scratchy throat and postnasal  HEENT:  NCAT, EOM intact, no facial asymmetry,   NECK:  good range of motion  RR: in NAD over video, normal respiratory rate   ABD: Soft, NT per Pt self palpation  EXT: No rash or edema observed over video  NEURO: Alert oriented to person/place/date and time , normal mood and affect, able to follow commands ,  No focal changes over video     ASSESSMENT/PLAN:  1. PND (post-nasal drip)  Astelin nasal spray and  - azithromycin (ZITHROMAX) 250 MG tablet; Take 1 tablet by mouth See Admin Instructions for 5 days 500mg on day 1 followed by 250mg on days 2 - 5  Dispense: 6 tablet; Refill: 0  Take antibiotic with food/water and probiotic    2.  Protracted URI  Zithromax pack, take with    3.  Throat soreness  Warm salt water gargles every 3-4 hours  Alternate 650 mg tylenol with  400 mg advil  Every 6-8 hrs as needed for fever, pain    Follow up if sxs persist or worsen and for routine care with PCP    20 Total Minutes spent pre charting (reviewing problem list, reviewing health maintenance items , meds, any test results, consultant and hospital notes ) and  obtaining present visit history, performing appropriate medical exam/evaluation, counseling and educating the patient (and family), ordering medications ,tests, and procedures as needed, refilling medication(s), placing referral(s) when needed in addition to coordinating care for this patient and documenting in electronic health record

## 2022-05-10 NOTE — TELEPHONE ENCOUNTER
She can get generic loratadine at the drugstore over-the-counter such probably why it is being denied

## 2022-05-10 NOTE — TELEPHONE ENCOUNTER
RECEIVED DENIAL. DENIAL LETTER ATTACHED. If this requires a response please respond to the pool. [address]). Please advise patient thank you.

## 2022-05-27 ENCOUNTER — TELEPHONE (OUTPATIENT)
Dept: FAMILY MEDICINE CLINIC | Age: 67
End: 2022-05-27

## 2022-05-27 DIAGNOSIS — R73.03 PRE-DIABETES: Primary | ICD-10-CM

## 2022-05-27 DIAGNOSIS — R53.83 OTHER FATIGUE: ICD-10-CM

## 2022-05-31 DIAGNOSIS — R73.03 PRE-DIABETES: ICD-10-CM

## 2022-05-31 DIAGNOSIS — R53.83 OTHER FATIGUE: ICD-10-CM

## 2022-05-31 LAB
A/G RATIO: 2 (ref 1.1–2.2)
ALBUMIN SERPL-MCNC: 4.6 G/DL (ref 3.4–5)
ALP BLD-CCNC: 71 U/L (ref 40–129)
ALT SERPL-CCNC: 12 U/L (ref 10–40)
ANION GAP SERPL CALCULATED.3IONS-SCNC: 15 MMOL/L (ref 3–16)
AST SERPL-CCNC: 20 U/L (ref 15–37)
BILIRUB SERPL-MCNC: 0.6 MG/DL (ref 0–1)
BUN BLDV-MCNC: 10 MG/DL (ref 7–20)
CALCIUM SERPL-MCNC: 9.7 MG/DL (ref 8.3–10.6)
CHLORIDE BLD-SCNC: 103 MMOL/L (ref 99–110)
CHOLESTEROL, TOTAL: 171 MG/DL (ref 0–199)
CO2: 23 MMOL/L (ref 21–32)
CREAT SERPL-MCNC: 0.8 MG/DL (ref 0.6–1.2)
GFR AFRICAN AMERICAN: >60
GFR NON-AFRICAN AMERICAN: >60
GLUCOSE BLD-MCNC: 118 MG/DL (ref 70–99)
HCT VFR BLD CALC: 42.1 % (ref 36–48)
HDLC SERPL-MCNC: 43 MG/DL (ref 40–60)
HEMOGLOBIN: 14.4 G/DL (ref 12–16)
LDL CHOLESTEROL CALCULATED: 109 MG/DL
MCH RBC QN AUTO: 33.3 PG (ref 26–34)
MCHC RBC AUTO-ENTMCNC: 34.3 G/DL (ref 31–36)
MCV RBC AUTO: 97 FL (ref 80–100)
PDW BLD-RTO: 13.9 % (ref 12.4–15.4)
PLATELET # BLD: 282 K/UL (ref 135–450)
PMV BLD AUTO: 8.3 FL (ref 5–10.5)
POTASSIUM SERPL-SCNC: 4.5 MMOL/L (ref 3.5–5.1)
RBC # BLD: 4.34 M/UL (ref 4–5.2)
SODIUM BLD-SCNC: 141 MMOL/L (ref 136–145)
TOTAL PROTEIN: 6.9 G/DL (ref 6.4–8.2)
TRIGL SERPL-MCNC: 94 MG/DL (ref 0–150)
TSH REFLEX: 2.25 UIU/ML (ref 0.27–4.2)
VLDLC SERPL CALC-MCNC: 19 MG/DL
WBC # BLD: 6.3 K/UL (ref 4–11)

## 2022-06-01 LAB
ESTIMATED AVERAGE GLUCOSE: 122.6 MG/DL
HBA1C MFR BLD: 5.9 %

## 2022-06-02 ENCOUNTER — TELEPHONE (OUTPATIENT)
Dept: FAMILY MEDICINE CLINIC | Age: 67
End: 2022-06-02

## 2022-06-02 NOTE — TELEPHONE ENCOUNTER
----- Message from Raza Shearer sent at 6/2/2022  4:27 PM EDT -----  Subject: Appointment Request    Reason for Call: Routine Physical Exam    QUESTIONS  Type of Appointment? Established Patient  Reason for appointment request? No appointments available during search  Additional Information for Provider? Patient was called today to   reschedule her appointment for friday for her AWV and there aren't any   appointments. Please call to schedule.  ---------------------------------------------------------------------------  --------------  CALL BACK INFO  What is the best way for the office to contact you? OK to leave message on   voicemail  Preferred Call Back Phone Number? [phone]  ---------------------------------------------------------------------------  --------------  SCRIPT ANSWERS  Relationship to Patient? Self  (If the patient has Medicare as their primary insurance coverage ask this   question) Are you requesting a Medicare Annual Wellness Visit? No  (Is the patient requesting a pap smear with their physical exam?)? No  (Is the patient requesting their annual physical and does not need PAP or   AWV per above?)? Yes   Have you been diagnosed with, awaiting test results for, or told that you   are suspected of having COVID-19 (Coronavirus)? (If patient has tested   negative or was tested as a requirement for work, school, or travel and   not based on symptoms, answer no)? No  Within the past 10 days have you developed any of the following symptoms   (answer no if symptoms have been present longer than 10 days or began   more than 10 days ago)? Fever or Chills, Cough, Shortness of breath or   difficulty breathing, Loss of taste or smell, Sore throat, Nasal   congestion, Sneezing or runny nose, Fatigue or generalized body aches   (answer no if pain is specific to a body part e.g. back pain), Diarrhea,   Headache? No  Have you had close contact with someone with COVID-19 in the last 7 days?    No  (Service Expert  click yes below to proceed with [employer] As Usual   Scheduling)?  Yes

## 2022-06-13 ENCOUNTER — OFFICE VISIT (OUTPATIENT)
Dept: FAMILY MEDICINE CLINIC | Age: 67
End: 2022-06-13
Payer: MEDICARE

## 2022-06-13 VITALS
HEIGHT: 65 IN | HEART RATE: 76 BPM | WEIGHT: 280 LBS | DIASTOLIC BLOOD PRESSURE: 78 MMHG | SYSTOLIC BLOOD PRESSURE: 138 MMHG | BODY MASS INDEX: 46.65 KG/M2 | OXYGEN SATURATION: 97 %

## 2022-06-13 DIAGNOSIS — C44.319 BASAL CELL CARCINOMA OF RIGHT FOREHEAD: ICD-10-CM

## 2022-06-13 DIAGNOSIS — Z00.00 INITIAL MEDICARE ANNUAL WELLNESS VISIT: Primary | ICD-10-CM

## 2022-06-13 DIAGNOSIS — E66.01 CLASS 3 SEVERE OBESITY DUE TO EXCESS CALORIES WITHOUT SERIOUS COMORBIDITY WITH BODY MASS INDEX (BMI) OF 40.0 TO 44.9 IN ADULT (HCC): ICD-10-CM

## 2022-06-13 PROCEDURE — 1123F ACP DISCUSS/DSCN MKR DOCD: CPT | Performed by: FAMILY MEDICINE

## 2022-06-13 PROCEDURE — G0009 ADMIN PNEUMOCOCCAL VACCINE: HCPCS | Performed by: FAMILY MEDICINE

## 2022-06-13 PROCEDURE — 3017F COLORECTAL CA SCREEN DOC REV: CPT | Performed by: FAMILY MEDICINE

## 2022-06-13 PROCEDURE — 90677 PCV20 VACCINE IM: CPT | Performed by: FAMILY MEDICINE

## 2022-06-13 PROCEDURE — G0402 INITIAL PREVENTIVE EXAM: HCPCS | Performed by: FAMILY MEDICINE

## 2022-06-13 SDOH — ECONOMIC STABILITY: FOOD INSECURITY: WITHIN THE PAST 12 MONTHS, THE FOOD YOU BOUGHT JUST DIDN'T LAST AND YOU DIDN'T HAVE MONEY TO GET MORE.: NEVER TRUE

## 2022-06-13 SDOH — ECONOMIC STABILITY: FOOD INSECURITY: WITHIN THE PAST 12 MONTHS, YOU WORRIED THAT YOUR FOOD WOULD RUN OUT BEFORE YOU GOT MONEY TO BUY MORE.: NEVER TRUE

## 2022-06-13 ASSESSMENT — LIFESTYLE VARIABLES: HOW OFTEN DO YOU HAVE A DRINK CONTAINING ALCOHOL: NEVER

## 2022-06-13 ASSESSMENT — PATIENT HEALTH QUESTIONNAIRE - PHQ9
SUM OF ALL RESPONSES TO PHQ9 QUESTIONS 1 & 2: 0
SUM OF ALL RESPONSES TO PHQ QUESTIONS 1-9: 0
SUM OF ALL RESPONSES TO PHQ QUESTIONS 1-9: 0
2. FEELING DOWN, DEPRESSED OR HOPELESS: 0
SUM OF ALL RESPONSES TO PHQ QUESTIONS 1-9: 0
SUM OF ALL RESPONSES TO PHQ QUESTIONS 1-9: 0
1. LITTLE INTEREST OR PLEASURE IN DOING THINGS: 0

## 2022-06-13 ASSESSMENT — SOCIAL DETERMINANTS OF HEALTH (SDOH): HOW HARD IS IT FOR YOU TO PAY FOR THE VERY BASICS LIKE FOOD, HOUSING, MEDICAL CARE, AND HEATING?: NOT HARD AT ALL

## 2022-06-13 NOTE — PATIENT INSTRUCTIONS
Personalized Preventive Plan for David Almodovar - 6/13/2022  Medicare offers a range of preventive health benefits. Some of the tests and screenings are paid in full while other may be subject to a deductible, co-insurance, and/or copay. Some of these benefits include a comprehensive review of your medical history including lifestyle, illnesses that may run in your family, and various assessments and screenings as appropriate. After reviewing your medical record and screening and assessments performed today your provider may have ordered immunizations, labs, imaging, and/or referrals for you. A list of these orders (if applicable) as well as your Preventive Care list are included within your After Visit Summary for your review. Other Preventive Recommendations:    · A preventive eye exam performed by an eye specialist is recommended every 1-2 years to screen for glaucoma; cataracts, macular degeneration, and other eye disorders. · A preventive dental visit is recommended every 6 months. · Try to get at least 150 minutes of exercise per week or 10,000 steps per day on a pedometer . · Order or download the FREE \"Exercise & Physical Activity: Your Everyday Guide\" from [employer]. Call 5-569.877.7385 or search [employer] online. · You need 8799-9171 mg of calcium and 4258-0348 IU of vitamin D per day. It is possible to meet your calcium requirement with diet alone, but a vitamin D supplement is usually necessary to meet this goal.  · When exposed to the sun, use a sunscreen that protects against both UVA and UVB radiation with an SPF of 30 or greater. Reapply every 2 to 3 hours or after sweating, drying off with a towel, or swimming. · Always wear a seat belt when traveling in a car. Always wear a helmet when riding a bicycle or motorcycle.

## 2022-06-13 NOTE — PROGRESS NOTES
Medicare Annual Wellness Visit    Maria De Jesus Castrejon is here for Medicare AWV    Assessment & Plan   Initial Medicare annual wellness visit      Recommendations for Preventive Services Due: see orders and patient instructions/AVS.  Recommended screening schedule for the next 5-10 years is provided to the patient in written form: see Patient Instructions/AVS.     Return for Medicare Annual Wellness Visit in 1 year. Subjective   The following acute and/or chronic problems were also addressed today:  Wt back up    Patient's complete Health Risk Assessment and screening values have been reviewed and are found in Flowsheets. The following problems were reviewed today and where indicated follow up appointments were made and/or referrals ordered.     Positive Risk Factor Screenings with Interventions:             General Health and ACP:  General  In general, how would you say your health is?: Very Good  In the past 7 days, have you experienced any of the following: New or Increased Pain, New or Increased Fatigue, Loneliness, Social Isolation, Stress or Anger?: No  Do you get the social and emotional support that you need?: Yes  Do you have a Living Will?: (!) No    Advance Directives     Power of  Living Will ACP-Advance Directive ACP-Power of     Not on File Not on File Not on File Not on File      General Health Risk Interventions:  · Poor self-assessment of health status: patient declines any further evaluation/treatment for this issue    Health Habits/Nutrition:     Physical Activity: Inactive    Days of Exercise per Week: 0 days    Minutes of Exercise per Session: 0 min     Have you lost any weight without trying in the past 3 months?: No  Body mass index: (!) 46.59  Have you seen the dentist within the past year?: Yes    Health Habits/Nutrition Interventions:  · Inadequate physical activity:  patient agrees to exercise for at least 150 minutes/week    Hearing/Vision:  Do you or your family notice any trouble with your hearing that hasn't been managed with hearing aids?: No  Do you have difficulty driving, watching TV, or doing any of your daily activities because of your eyesight?: No  Have you had an eye exam within the past year?: (!) No  No exam data present    Hearing/Vision Interventions:  · Hearing concerns:  patient declines any further evaluation/treatment for hearing issues            Objective   Vitals:    06/13/22 0821   BP: 138/78   Pulse: 76   SpO2: 97%   Weight: 280 lb (127 kg)   Height: 5' 5\" (1.651 m)      Body mass index is 46.59 kg/m². General Appearance: alert and oriented to person, place and time, well-developed and well-nourished, in no acute distress and obese  ENT: tympanic membrane, external ear and ear canal normal bilaterally, oropharynx clear and moist with normal mucous membranes  Neck: neck supple and non tender without mass, no thyromegaly or thyroid nodules, no cervical lymphadenopathy   Pulmonary/Chest: clear to auscultation bilaterally- no wheezes, rales or rhonchi, normal air movement, no respiratory distress  Cardiovascular: normal rate, normal S1 and S2, no gallops, intact distal pulses and no carotid bruits  Abdomen: soft, non-tender, non-distended, normal bowel sounds, no masses or organomegaly  Extremities: no cyanosis and no clubbing       Allergies   Allergen Reactions    Flonase [Fluticasone Propionate] Nausea Only    Levaquin [Levofloxacin In D5w] Other (See Comments)     headache     Prior to Visit Medications    Medication Sig Taking? Authorizing Provider   Cholecalciferol (VITAMIN D3) 2000 units CAPS Take 1 capsule by mouth daily Yes Historical Provider, MD     Encounter Diagnoses   Name Primary?     Initial Medicare annual wellness visit Yes    Class 3 severe obesity due to excess calories without serious comorbidity with body mass index (BMI) of 40.0 to 44.9 in [facility])     Basal cell carcinoma of right forehead    prediabetes  Lab Results Component Value Date    WBC 6.3 05/31/2022    HGB 14.4 05/31/2022    HCT 42.1 05/31/2022    MCV 97.0 05/31/2022     05/31/2022     Lab Results   Component Value Date    CHOL 171 05/31/2022    CHOL 150 09/25/2019    CHOL 150 06/06/2017     Lab Results   Component Value Date    TRIG 94 05/31/2022    TRIG 71 09/25/2019    TRIG 86 06/06/2017     Lab Results   Component Value Date    HDL 43 05/31/2022    HDL 42 04/09/2021    HDL 46 09/25/2019     Lab Results   Component Value Date    LDLCALC 109 (H) 05/31/2022    LDLCALC 97 04/09/2021    LDLCALC 90 09/25/2019     Lab Results   Component Value Date    LABVLDL 19 05/31/2022    LABVLDL 18 04/09/2021    LABVLDL 14 09/25/2019     Lab Results   Component Value Date    CHOLHDLRATIO 2.9 11/21/2011     Lab Results   Component Value Date     05/31/2022    K 4.5 05/31/2022     05/31/2022    CO2 23 05/31/2022    BUN 10 05/31/2022    CREATININE 0.8 05/31/2022    GLUCOSE 118 (H) 05/31/2022    CALCIUM 9.7 05/31/2022    PROT 6.9 05/31/2022    LABALBU 4.6 05/31/2022    BILITOT 0.6 05/31/2022    ALKPHOS 71 05/31/2022    AST 20 05/31/2022    ALT 12 05/31/2022    LABGLOM >60 05/31/2022    GFRAA >60 05/31/2022    AGRATIO 2.0 05/31/2022    GLOB 2.4 04/09/2021     Lab Results   Component Value Date    TSH 3.14 09/25/2019       CareTeam (Including outside providers/suppliers regularly involved in providing care):   Patient Care Team:  Genna Benton MD as PCP - General (Family Medicine)  Genna Benton MD as PCP - [facility] Empaneled Provider  Derm  opthal  dentist   Reviewed and updated this visit:  Tobacco  Allergies  Meds  Med Hx  Surg Hx  Soc Hx  Fam Hx         prevnar 20  Decline covid booster  Lose wt  More exercise

## 2022-07-07 ENCOUNTER — HOSPITAL ENCOUNTER (OUTPATIENT)
Dept: CT IMAGING | Age: 67
Discharge: HOME | End: 2022-07-07
Payer: MEDICARE

## 2022-07-07 ENCOUNTER — OFFICE VISIT (OUTPATIENT)
Dept: FAMILY MEDICINE CLINIC | Age: 67
End: 2022-07-07
Payer: MEDICARE

## 2022-07-07 ENCOUNTER — TELEPHONE (OUTPATIENT)
Dept: FAMILY MEDICINE CLINIC | Age: 67
End: 2022-07-07

## 2022-07-07 VITALS
HEIGHT: 65 IN | OXYGEN SATURATION: 96 % | HEART RATE: 70 BPM | BODY MASS INDEX: 45.98 KG/M2 | DIASTOLIC BLOOD PRESSURE: 81 MMHG | SYSTOLIC BLOOD PRESSURE: 127 MMHG | WEIGHT: 276 LBS

## 2022-07-07 DIAGNOSIS — R10.30 LOWER ABDOMINAL PAIN: Primary | ICD-10-CM

## 2022-07-07 DIAGNOSIS — R10.30 LOWER ABDOMINAL PAIN: ICD-10-CM

## 2022-07-07 PROBLEM — K57.90 DIVERTICULOSIS: Status: ACTIVE | Noted: 2022-07-07

## 2022-07-07 LAB
BILIRUBIN, POC: NORMAL
BLOOD URINE, POC: NORMAL
BUN BLDV-MCNC: 11 MG/DL (ref 7–20)
CLARITY, POC: NORMAL
COLOR, POC: YELLOW
CREAT SERPL-MCNC: 0.8 MG/DL (ref 0.6–1.2)
GFR AFRICAN AMERICAN: >60
GFR NON-AFRICAN AMERICAN: >60
GLUCOSE URINE, POC: NORMAL
KETONES, POC: NORMAL
LEUKOCYTE EST, POC: NORMAL
NITRITE, POC: NORMAL
PH, POC: 6
PROTEIN, POC: NORMAL
SPECIFIC GRAVITY, POC: >=1.03
UROBILINOGEN, POC: 0.2

## 2022-07-07 PROCEDURE — 1036F TOBACCO NON-USER: CPT | Performed by: NURSE PRACTITIONER

## 2022-07-07 PROCEDURE — 99213 OFFICE O/P EST LOW 20 MIN: CPT | Performed by: NURSE PRACTITIONER

## 2022-07-07 PROCEDURE — G8399 PT W/DXA RESULTS DOCUMENT: HCPCS | Performed by: NURSE PRACTITIONER

## 2022-07-07 PROCEDURE — G8427 DOCREV CUR MEDS BY ELIG CLIN: HCPCS | Performed by: NURSE PRACTITIONER

## 2022-07-07 PROCEDURE — 36415 COLL VENOUS BLD VENIPUNCTURE: CPT

## 2022-07-07 PROCEDURE — 81002 URINALYSIS NONAUTO W/O SCOPE: CPT | Performed by: NURSE PRACTITIONER

## 2022-07-07 PROCEDURE — 82565 ASSAY OF CREATININE: CPT

## 2022-07-07 PROCEDURE — 1090F PRES/ABSN URINE INCON ASSESS: CPT | Performed by: NURSE PRACTITIONER

## 2022-07-07 PROCEDURE — G8417 CALC BMI ABV UP PARAM F/U: HCPCS | Performed by: NURSE PRACTITIONER

## 2022-07-07 PROCEDURE — 6360000004 HC RX CONTRAST MEDICATION: Performed by: NURSE PRACTITIONER

## 2022-07-07 PROCEDURE — 1123F ACP DISCUSS/DSCN MKR DOCD: CPT | Performed by: NURSE PRACTITIONER

## 2022-07-07 PROCEDURE — 84520 ASSAY OF UREA NITROGEN: CPT

## 2022-07-07 PROCEDURE — 74177 CT ABD & PELVIS W/CONTRAST: CPT

## 2022-07-07 PROCEDURE — 3017F COLORECTAL CA SCREEN DOC REV: CPT | Performed by: NURSE PRACTITIONER

## 2022-07-07 RX ORDER — METRONIDAZOLE 500 MG/1
500 TABLET ORAL 2 TIMES DAILY
Qty: 14 TABLET | Refills: 0 | Status: SHIPPED | OUTPATIENT
Start: 2022-07-07 | End: 2022-07-14

## 2022-07-07 RX ORDER — CIPROFLOXACIN 500 MG/1
500 TABLET, FILM COATED ORAL 2 TIMES DAILY
Qty: 14 TABLET | Refills: 0 | Status: SHIPPED | OUTPATIENT
Start: 2022-07-07 | End: 2022-07-14

## 2022-07-07 RX ADMIN — IOPAMIDOL 50 ML: 755 INJECTION, SOLUTION INTRAVENOUS at 10:58

## 2022-07-07 ASSESSMENT — ENCOUNTER SYMPTOMS
CONSTIPATION: 0
RESPIRATORY NEGATIVE: 1
VOMITING: 0
ABDOMINAL DISTENTION: 1
ANAL BLEEDING: 0
BLOOD IN STOOL: 0
ABDOMINAL PAIN: 1
NAUSEA: 1
DIARRHEA: 1
RECTAL PAIN: 0

## 2022-07-07 ASSESSMENT — PATIENT HEALTH QUESTIONNAIRE - PHQ9
1. LITTLE INTEREST OR PLEASURE IN DOING THINGS: 0
SUM OF ALL RESPONSES TO PHQ QUESTIONS 1-9: 0
2. FEELING DOWN, DEPRESSED OR HOPELESS: 0
SUM OF ALL RESPONSES TO PHQ QUESTIONS 1-9: 0
SUM OF ALL RESPONSES TO PHQ9 QUESTIONS 1 & 2: 0
SUM OF ALL RESPONSES TO PHQ QUESTIONS 1-9: 0
SUM OF ALL RESPONSES TO PHQ QUESTIONS 1-9: 0

## 2022-07-07 NOTE — TELEPHONE ENCOUNTER
Stat results for CT abd pelvis no acute findings in abd or pelvis  Colonic diverticulosis no acute feature no evidence of appendicitis  Mild hepatasteatosis    The results should be available in [facility]

## 2022-07-07 NOTE — PROGRESS NOTES
2022     Steffen Woodruff (:  1955) is a 74 y.o. female, here for evaluation of the following medical concerns:    Chief Complaint   Patient presents with    Abdominal Pain     lower abdominal pain, started     Diarrhea     started -     Abdominal Pain:  Onset one week ago. Had diarrhea for four days and has since subsided. Had slight nausea. Denies blood or black stools. Denies urinary symptoms. Denies fevers. Has never had anything like this before. Has not taken anything for this. No surgeries on abdomen. No recent travel. Review of Systems   Constitutional: Negative. Respiratory: Negative. Cardiovascular: Negative. Gastrointestinal: Positive for abdominal distention, abdominal pain, diarrhea and nausea. Negative for anal bleeding, blood in stool, constipation, rectal pain and vomiting. Genitourinary: Negative. Negative for dysuria, flank pain, frequency, hematuria and urgency. Neurological: Negative. Prior to Visit Medications    Medication Sig Taking? Authorizing Provider   Cholecalciferol (VITAMIN D3) 2000 units CAPS Take 1 capsule by mouth daily Yes Historical Provider, MD        Social History     Tobacco Use    Smoking status: Never Smoker    Smokeless tobacco: Never Used   Vaping Use    Vaping Use: Never used   Substance Use Topics    Alcohol use: No    Drug use: No        Vitals:    22 0859   BP: 127/81   Pulse: 70   SpO2: 96%   Weight: 276 lb (125.2 kg)   Height: 5' 5\" (1.651 m)     Estimated body mass index is 45.93 kg/m² as calculated from the following:    Height as of this encounter: 5' 5\" (1.651 m). Weight as of this encounter: 276 lb (125.2 kg). Physical Exam  Vitals and nursing note reviewed. Constitutional:       General: She is not in acute distress. Appearance: Normal appearance. She is normal weight. She is not ill-appearing. Cardiovascular:      Rate and Rhythm: Normal rate and regular rhythm.       Heart sounds: Normal heart sounds, S1 normal and S2 normal. No murmur heard. Pulmonary:      Effort: Pulmonary effort is normal.      Breath sounds: Normal breath sounds and air entry. Abdominal:      General: Abdomen is protuberant. Bowel sounds are normal. There is no distension or abdominal bruit. Palpations: Abdomen is soft. There is no hepatomegaly, splenomegaly, mass or pulsatile mass. Tenderness: There is abdominal tenderness in the right lower quadrant, suprapubic area and left lower quadrant. There is rebound. There is no right CVA tenderness, left CVA tenderness or guarding. Positive signs include McBurney's sign. Negative signs include Valadez's sign. Hernia: No hernia is present. Skin:     General: Skin is warm and dry. Capillary Refill: Capillary refill takes less than 2 seconds. Neurological:      General: No focal deficit present. Mental Status: She is alert. Psychiatric:         Mood and Affect: Mood normal.         ASSESSMENT/PLAN:  1. Lower abdominal pain  Concern for diverticulitis vs appendicitis  Recommend bland diet, advance as tolerated  Push water  Will get STAT CT abdomen today, will send meds in after scan  - POCT Urinalysis no Micro  - CT ABDOMEN PELVIS W IV CONTRAST Additional Contrast? None; Future    Return if symptoms worsen or fail to improve.

## 2022-07-07 NOTE — PATIENT INSTRUCTIONS
Patient Education        Abdominal Pain: Care Instructions  Your Care Instructions     Abdominal pain has many possible causes. Some aren't serious and get better on their own in a few days. Others need more testing and treatment. If your pain continues or gets worse, you need to be rechecked and may need more tests tofind out what is wrong. You may need surgery to correct the problem. Don't ignore new symptoms, such as fever, nausea and vomiting, urination problems, pain that gets worse, and dizziness. These may be signs of a moreserious problem. Your doctor may have recommended a follow-up visit in the next 8 to 12 hours. If you are not getting better, you may need more tests or treatment. The doctor has checked you carefully, but problems can develop later. If you notice any problems or new symptoms, get medical treatment right away. Follow-up care is a key part of your treatment and safety. Be sure to make and go to all appointments, and call your doctor if you are having problems. It's also a good idea to know your test results and keep alist of the medicines you take. How can you care for yourself at home?  Rest until you feel better.  To prevent dehydration, drink plenty of fluids. Choose water and other clear liquids until you feel better. If you have kidney, heart, or liver disease and have to limit fluids, talk with your doctor before you increase the amount of fluids you drink.  If your stomach is upset, eat mild foods, such as rice, dry toast or crackers, bananas, and applesauce. Try eating several small meals instead of two or three large ones.  Wait until 48 hours after all symptoms have gone away before you have spicy foods, alcohol, and drinks that contain caffeine.  Do not eat foods that are high in fat.  Avoid anti-inflammatory medicines such as aspirin, ibuprofen (Advil, Motrin), and naproxen (Aleve). These can cause stomach upset.  Talk to your doctor if you take daily aspirin for another health problem. When should you call for help? Call 911 anytime you think you may need emergency care. For example, call if:     You passed out (lost consciousness).      You pass maroon or very bloody stools.      You vomit blood or what looks like coffee grounds.      You have new, severe belly pain. Call your doctor now or seek immediate medical care if:     Your pain gets worse, especially if it becomes focused in one area of your belly.      You have a new or higher fever.      Your stools are black and look like tar, or they have streaks of blood.      You have unexpected vaginal bleeding.      You have symptoms of a urinary tract infection. These may include:  ? Pain when you urinate. ? Urinating more often than usual.  ? Blood in your urine.      You are dizzy or lightheaded, or you feel like you may faint. Watch closely for changes in your health, and be sure to contact your doctor if:     You are not getting better after 1 day (24 hours). Where can you learn more? Go to https://PasswordBox.SafeShot [email or web address] org and sign in to your Clutter account. Enter X225 in the TechnoSpin box to learn more about \"Abdominal Pain: Care Instructions. \"     If you do not have an account, please click on the \"Sign Up Now\" link. Current as of: March 9, 2022               Content Version: 13.3  © 0887-6932 Healthwise, Incorporated. Care instructions adapted under license by [facility] ([facility]). If you have questions about a medical condition or this instruction, always ask your healthcare professional. [place] [address] any warranty or liability for your use of this information.

## 2022-07-14 ENCOUNTER — OFFICE VISIT (OUTPATIENT)
Dept: FAMILY MEDICINE CLINIC | Age: 67
End: 2022-07-14
Payer: MEDICARE

## 2022-07-14 VITALS
HEART RATE: 75 BPM | BODY MASS INDEX: 45.65 KG/M2 | WEIGHT: 274 LBS | DIASTOLIC BLOOD PRESSURE: 80 MMHG | SYSTOLIC BLOOD PRESSURE: 130 MMHG | OXYGEN SATURATION: 97 % | HEIGHT: 65 IN

## 2022-07-14 DIAGNOSIS — M54.6 ACUTE BILATERAL THORACIC BACK PAIN: ICD-10-CM

## 2022-07-14 DIAGNOSIS — R10.84 GENERALIZED ABDOMINAL PAIN: Primary | ICD-10-CM

## 2022-07-14 PROCEDURE — 1123F ACP DISCUSS/DSCN MKR DOCD: CPT | Performed by: NURSE PRACTITIONER

## 2022-07-14 PROCEDURE — G8417 CALC BMI ABV UP PARAM F/U: HCPCS | Performed by: NURSE PRACTITIONER

## 2022-07-14 PROCEDURE — G8427 DOCREV CUR MEDS BY ELIG CLIN: HCPCS | Performed by: NURSE PRACTITIONER

## 2022-07-14 PROCEDURE — G8399 PT W/DXA RESULTS DOCUMENT: HCPCS | Performed by: NURSE PRACTITIONER

## 2022-07-14 PROCEDURE — 3017F COLORECTAL CA SCREEN DOC REV: CPT | Performed by: NURSE PRACTITIONER

## 2022-07-14 PROCEDURE — 99214 OFFICE O/P EST MOD 30 MIN: CPT | Performed by: NURSE PRACTITIONER

## 2022-07-14 PROCEDURE — 1036F TOBACCO NON-USER: CPT | Performed by: NURSE PRACTITIONER

## 2022-07-14 PROCEDURE — 1090F PRES/ABSN URINE INCON ASSESS: CPT | Performed by: NURSE PRACTITIONER

## 2022-07-14 PROCEDURE — 81002 URINALYSIS NONAUTO W/O SCOPE: CPT | Performed by: NURSE PRACTITIONER

## 2022-07-14 ASSESSMENT — PATIENT HEALTH QUESTIONNAIRE - PHQ9
SUM OF ALL RESPONSES TO PHQ QUESTIONS 1-9: 0
SUM OF ALL RESPONSES TO PHQ QUESTIONS 1-9: 0
2. FEELING DOWN, DEPRESSED OR HOPELESS: 0
SUM OF ALL RESPONSES TO PHQ QUESTIONS 1-9: 0
SUM OF ALL RESPONSES TO PHQ QUESTIONS 1-9: 0
SUM OF ALL RESPONSES TO PHQ9 QUESTIONS 1 & 2: 0
1. LITTLE INTEREST OR PLEASURE IN DOING THINGS: 0

## 2022-07-14 ASSESSMENT — ENCOUNTER SYMPTOMS
WHEEZING: 0
VOMITING: 0
DIARRHEA: 1
CHEST TIGHTNESS: 0
SHORTNESS OF BREATH: 0
ABDOMINAL PAIN: 1
COUGH: 0
NAUSEA: 0
CONSTIPATION: 0

## 2022-07-14 NOTE — PROGRESS NOTES
274 lb (124.3 kg). Physical Exam  Constitutional:       General: She is not in acute distress. Appearance: Normal appearance. She is normal weight. She is not ill-appearing. Cardiovascular:      Rate and Rhythm: Normal rate and regular rhythm. Heart sounds: Normal heart sounds, S1 normal and S2 normal. No murmur heard. Pulmonary:      Effort: Pulmonary effort is normal.      Breath sounds: Normal breath sounds and air entry. Abdominal:      General: Abdomen is protuberant. Bowel sounds are normal.      Palpations: Abdomen is soft. Tenderness: There is abdominal tenderness in the right lower quadrant and left lower quadrant. There is no right CVA tenderness, left CVA tenderness, guarding or rebound. Negative signs include Valadez's sign and McBurney's sign. Hernia: No hernia is present. Musculoskeletal:      Cervical back: Normal.      Thoracic back: No swelling, edema, deformity, signs of trauma, lacerations, spasms, tenderness or bony tenderness. Decreased range of motion. No scoliosis. Lumbar back: Normal.   Skin:     General: Skin is warm and dry. Capillary Refill: Capillary refill takes less than 2 seconds. Neurological:      General: No focal deficit present. Mental Status: She is alert. Psychiatric:         Mood and Affect: Mood normal.     CT Abdomen/Pelvis 7/7/2022  Impression   1. No acute findings within the abdomen or pelvis. Colonic diverticulosis   with no acute features. No evidence of appendicitis. 2. Mild hepatic steatosis. ASSESSMENT/PLAN:  1. Generalized abdominal pain  Etiology ? ?  - US ABDOMEN COMPLETE; Future  - Culture, Urine  - POCT Urinalysis no Micro  Discussed patient with Dr. Lynne Baltazar, will complete additional testing and follow up with Dr. Lynne Baltazar next week if still having pain    2. Acute bilateral thoracic back pain  - XR THORACIC SPINE (3 VIEWS);  Future  - Culture, Urine  - POCT Urinalysis no Micro    Time spent: 45 minutes, >50% of which was spent face to face with patient discussing HPI/plan/reviewing records and coordinating care    Return in 1 week (on 7/21/2022), or if symptoms worsen or fail to improve with Dr. James Choi.

## 2022-07-14 NOTE — PATIENT INSTRUCTIONS
for another health problem. When should you call for help? Call 911 anytime you think you may need emergency care. For example, call if:     You passed out (lost consciousness).      You pass maroon or very bloody stools.      You vomit blood or what looks like coffee grounds.      You have new, severe belly pain. Call your doctor now or seek immediate medical care if:     Your pain gets worse, especially if it becomes focused in one area of your belly.      You have a new or higher fever.      Your stools are black and look like tar, or they have streaks of blood.      You have unexpected vaginal bleeding.      You have symptoms of a urinary tract infection. These may include:  ? Pain when you urinate. ? Urinating more often than usual.  ? Blood in your urine.      You are dizzy or lightheaded, or you feel like you may faint. Watch closely for changes in your health, and be sure to contact your doctor if:     You are not getting better after 1 day (24 hours). Where can you learn more? Go to https://lingoking GmbH.Gamestaq. org and sign in to your Helium account. Enter K473 in the SETVI box to learn more about \"Abdominal Pain: Care Instructions. \"     If you do not have an account, please click on the \"Sign Up Now\" link. Current as of: March 9, 2022               Content Version: 13.3  © 7813-2245 [employer]. Care instructions adapted under license by [facility] ([facility]). If you have questions about a medical condition or this instruction, always ask your healthcare professional. [place] [address] any warranty or liability for your use of this information. Patient Education        Back Pain: Care Instructions  Your Care Instructions     Back pain has many possible causes. It is often related to problems with muscles and ligaments of the back. It may also be related to problems with the nerves, discs, or bones of the back.  Moving, lifting, standing, sitting, or sleeping in an awkward way can strain the back. Sometimes you don't notice theinjury until later. Arthritis is another common cause of back pain. Although it may hurt a lot, back pain usually improves on its own within several weeks. Most people recover in 12 weeks or less. Using good home treatment and being careful not to stress your back can help you feel bettersooner. Follow-up care is a key part of your treatment and safety. Be sure to make and go to all appointments, and call your doctor if you are having problems. It's also a good idea to know your test results and keep alist of the medicines you take. How can you care for yourself at home?  Sit or lie in positions that are most comfortable and reduce your pain. Try one of these positions when you lie down:  ? Lie on your back with your knees bent and supported by large pillows. ? Lie on the floor with your legs on the seat of a sofa or chair. ? Lie on your side with your knees and hips bent and a pillow between your legs. ? Lie on your stomach if it does not make pain worse.  Do not sit up in bed, and avoid soft couches and twisted positions. Bed rest can help relieve pain at first, but it delays healing. Avoid bed rest after the first day of back pain.  Change positions every 30 minutes. If you must sit for long periods of time, take breaks from sitting. Get up and walk around, or lie in a comfortable position.  Try using a heating pad on a low or medium setting for 15 to 20 minutes every 2 or 3 hours. Try a warm shower in place of one session with the heating pad.  You can also try an ice pack for 10 to 15 minutes every 2 to 3 hours. Put a thin cloth between the ice pack and your skin.  Take pain medicines exactly as directed. ? If the doctor gave you a prescription medicine for pain, take it as prescribed.   ? If you are not taking a prescription pain medicine, ask your doctor if you can take an over-the-counter medicine.  Take short walks several times a day. You can start with 5 to 10 minutes, 3 or 4 times a day, and work up to longer walks. Walk on level surfaces and avoid hills and stairs until your back is better.  Return to work and other activities as soon as you can. Continued rest without activity is usually not good for your back.  To prevent future back pain, do exercises to stretch and strengthen your back and stomach. Learn how to use good posture, safe lifting techniques, and proper body mechanics. When should you call for help? Call your doctor now or seek immediate medical care if:     You have new or worsening numbness in your legs.      You have new or worsening weakness in your legs. (This could make it hard to stand up.)      You lose control of your bladder or bowels. Watch closely for changes in your health, and be sure to contact your doctor if:     You have a fever, lose weight, or don't feel well.      You do not get better as expected. Where can you learn more? Go to https://Masterseek.Melinta. org and sign in to your Get.com account. Enter X260 in the Brandmail Solutions box to learn more about \"Back Pain: Care Instructions. \"     If you do not have an account, please click on the \"Sign Up Now\" link. Current as of: March 9, 2022               Content Version: 13.3  © 7903-3048 Healthwise, Incorporated. Care instructions adapted under license by [facility] ([facility]). If you have questions about a medical condition or this instruction, always ask your healthcare professional. [place] [address] any warranty or liability for your use of this information.

## 2022-07-15 ENCOUNTER — HOSPITAL ENCOUNTER (OUTPATIENT)
Dept: GENERAL RADIOLOGY | Age: 67
Discharge: HOME | End: 2022-07-15
Payer: MEDICARE

## 2022-07-15 ENCOUNTER — HOSPITAL ENCOUNTER (OUTPATIENT)
Dept: ULTRASOUND IMAGING | Age: 67
Discharge: HOME | End: 2022-07-15
Payer: MEDICARE

## 2022-07-15 DIAGNOSIS — M54.6 ACUTE BILATERAL THORACIC BACK PAIN: ICD-10-CM

## 2022-07-15 DIAGNOSIS — R10.84 GENERALIZED ABDOMINAL PAIN: ICD-10-CM

## 2022-07-15 PROCEDURE — 72072 X-RAY EXAM THORAC SPINE 3VWS: CPT

## 2022-07-15 PROCEDURE — 76700 US EXAM ABDOM COMPLETE: CPT

## 2022-07-15 NOTE — RESULT ENCOUNTER NOTE
The abdominal ultrasound shows only a fatty liver. This can be improved with slow weight loss and avoiding Tylenol. Your xray does show degenerative disc disease and some scoliosis.

## 2022-07-16 LAB — URINE CULTURE, ROUTINE: NORMAL

## 2022-07-22 ENCOUNTER — OFFICE VISIT (OUTPATIENT)
Dept: FAMILY MEDICINE CLINIC | Age: 67
End: 2022-07-22
Payer: MEDICARE

## 2022-07-22 VITALS
HEART RATE: 58 BPM | DIASTOLIC BLOOD PRESSURE: 80 MMHG | WEIGHT: 279 LBS | OXYGEN SATURATION: 98 % | SYSTOLIC BLOOD PRESSURE: 120 MMHG | BODY MASS INDEX: 46.43 KG/M2

## 2022-07-22 DIAGNOSIS — R10.84 GENERALIZED ABDOMINAL PAIN: Primary | ICD-10-CM

## 2022-07-22 PROCEDURE — 1090F PRES/ABSN URINE INCON ASSESS: CPT | Performed by: FAMILY MEDICINE

## 2022-07-22 PROCEDURE — G8427 DOCREV CUR MEDS BY ELIG CLIN: HCPCS | Performed by: FAMILY MEDICINE

## 2022-07-22 PROCEDURE — G8417 CALC BMI ABV UP PARAM F/U: HCPCS | Performed by: FAMILY MEDICINE

## 2022-07-22 PROCEDURE — 1036F TOBACCO NON-USER: CPT | Performed by: FAMILY MEDICINE

## 2022-07-22 PROCEDURE — 3017F COLORECTAL CA SCREEN DOC REV: CPT | Performed by: FAMILY MEDICINE

## 2022-07-22 PROCEDURE — G8399 PT W/DXA RESULTS DOCUMENT: HCPCS | Performed by: FAMILY MEDICINE

## 2022-07-22 PROCEDURE — 1123F ACP DISCUSS/DSCN MKR DOCD: CPT | Performed by: FAMILY MEDICINE

## 2022-07-22 PROCEDURE — 99213 OFFICE O/P EST LOW 20 MIN: CPT | Performed by: FAMILY MEDICINE

## 2022-07-22 NOTE — PROGRESS NOTES
Lei Palma is a 74 y.o. female. HPI:  Here for f/u of last 2 visits for abd pain, mid back apin  Abd ct, abd u/s, tspine series  - all pretty normal  Take Cipro and Flagyl empirically despite no evidence of diverticulitis on CAT scan  Now all symptoms have improved and she feels fine  Thinks she may have a little lactose intolerance as yogurt seems to cause some abdominal pain  Wt Readings from Last 3 Encounters:   07/22/22 279 lb (126.6 kg)   07/14/22 274 lb (124.3 kg)   07/07/22 276 lb (125.2 kg)     Meds, vitamins and allergies reviewed with Patient    ROS:  Gen: No fever  HEENT: No cold symptoms, no sore throat. CV:  Denies chest pain or palpitations. Pulm:  Denies shortness of breath, cough. Abd:  Denies abdominal pain, nausea and vomiting. Skin: no rash    Allergies   Allergen Reactions    Flonase [Fluticasone Propionate] Nausea Only    Levaquin [Levofloxacin In D5w] Other (See Comments)     headache       Prior to Visit Medications    Medication Sig Taking? Authorizing Provider   Cholecalciferol (VITAMIN D3) 2000 units CAPS Take 1 capsule by mouth daily Yes Historical Provider, MD       OBJECTIVE:  /80   Pulse 58   Wt 279 lb (126.6 kg)   SpO2 98%   BMI 46.43 kg/m²   GEN:  in NAD morbidly obese, up 5 pounds  CV:  Regular rate and rhythm, S1 and S2 normal, no murmurs, clicks  PULM:  Chest is clear, no wheezing ,  symmetric air entry throughout both lung fields.   ABD: Soft, NT, no hsm  NEURO: Alert and oriented ×3    ASSESSMENT/PLAN:  Abdominal pain-etiology unclear  Resolved  Observe for lactose intolerance    Fatty liver-instructed to avoid liver toxins such as alcohol  Exercise more  Check liver enzymes yearly   Lab Results   Component Value Date     05/31/2022    K 4.5 05/31/2022     05/31/2022    CO2 23 05/31/2022    BUN 11 07/07/2022    CREATININE 0.8 07/07/2022    GLUCOSE 118 (H) 05/31/2022    CALCIUM 9.7 05/31/2022    PROT 6.9 05/31/2022    LABALBU 4.6 05/31/2022    BILITOT 0.6 05/31/2022    ALKPHOS 71 05/31/2022    AST 20 05/31/2022    ALT 12 05/31/2022    LABGLOM >60 07/07/2022    GFRAA >60 07/07/2022    AGRATIO 2.0 05/31/2022    GLOB 2.4 04/09/2021      shoot for small weight loss such as 10 to 15 pounds

## 2023-03-15 ENCOUNTER — HOSPITAL ENCOUNTER (OUTPATIENT)
Dept: MAMMOGRAPHY | Age: 68
Discharge: HOME | End: 2023-03-15
Payer: MEDICARE

## 2023-03-15 VITALS — WEIGHT: 274 LBS | HEIGHT: 65 IN | BODY MASS INDEX: 45.65 KG/M2

## 2023-03-15 DIAGNOSIS — Z12.31 VISIT FOR SCREENING MAMMOGRAM: ICD-10-CM

## 2023-03-15 PROCEDURE — 77063 BREAST TOMOSYNTHESIS BI: CPT

## 2023-07-21 ENCOUNTER — OFFICE VISIT (OUTPATIENT)
Dept: FAMILY MEDICINE CLINIC | Age: 68
End: 2023-07-21

## 2023-07-21 VITALS
WEIGHT: 278 LBS | HEART RATE: 78 BPM | OXYGEN SATURATION: 98 % | BODY MASS INDEX: 46.26 KG/M2 | SYSTOLIC BLOOD PRESSURE: 132 MMHG | DIASTOLIC BLOOD PRESSURE: 70 MMHG

## 2023-07-21 DIAGNOSIS — R73.09 ELEVATED HEMOGLOBIN A1C: Primary | ICD-10-CM

## 2023-07-21 DIAGNOSIS — R53.83 FATIGUE, UNSPECIFIED TYPE: ICD-10-CM

## 2023-07-21 DIAGNOSIS — R73.09 ELEVATED HEMOGLOBIN A1C: ICD-10-CM

## 2023-07-21 DIAGNOSIS — Z00.00 WELL ADULT EXAM: ICD-10-CM

## 2023-07-21 LAB
BASOPHILS # BLD: 0 K/UL (ref 0–0.2)
BASOPHILS NFR BLD: 0.5 %
DEPRECATED RDW RBC AUTO: 13.5 % (ref 12.4–15.4)
EOSINOPHIL # BLD: 0.1 K/UL (ref 0–0.6)
EOSINOPHIL NFR BLD: 0.7 %
HCT VFR BLD AUTO: 41.7 % (ref 36–48)
HGB BLD-MCNC: 14.2 G/DL (ref 12–16)
LYMPHOCYTES # BLD: 1.8 K/UL (ref 1–5.1)
LYMPHOCYTES NFR BLD: 23.6 %
MCH RBC QN AUTO: 32.9 PG (ref 26–34)
MCHC RBC AUTO-ENTMCNC: 34 G/DL (ref 31–36)
MCV RBC AUTO: 96.7 FL (ref 80–100)
MONOCYTES # BLD: 0.5 K/UL (ref 0–1.3)
MONOCYTES NFR BLD: 6.6 %
NEUTROPHILS # BLD: 5.3 K/UL (ref 1.7–7.7)
NEUTROPHILS NFR BLD: 68.6 %
PLATELET # BLD AUTO: 306 K/UL (ref 135–450)
PMV BLD AUTO: 8.5 FL (ref 5–10.5)
RBC # BLD AUTO: 4.31 M/UL (ref 4–5.2)
TSH SERPL DL<=0.005 MIU/L-ACNC: 2.34 UIU/ML (ref 0.27–4.2)
WBC # BLD AUTO: 7.8 K/UL (ref 4–11)

## 2023-07-21 RX ORDER — IBUPROFEN 800 MG/1
800 TABLET ORAL
Qty: 30 TABLET | Refills: 1 | Status: SHIPPED | OUTPATIENT
Start: 2023-07-21

## 2023-07-21 ASSESSMENT — PATIENT HEALTH QUESTIONNAIRE - PHQ9
SUM OF ALL RESPONSES TO PHQ QUESTIONS 1-9: 0
2. FEELING DOWN, DEPRESSED OR HOPELESS: 0
SUM OF ALL RESPONSES TO PHQ QUESTIONS 1-9: 0
1. LITTLE INTEREST OR PLEASURE IN DOING THINGS: 0
SUM OF ALL RESPONSES TO PHQ9 QUESTIONS 1 & 2: 0

## 2023-07-22 LAB
ALBUMIN SERPL-MCNC: 4.4 G/DL (ref 3.4–5)
ALBUMIN/GLOB SERPL: 1.6 {RATIO} (ref 1.1–2.2)
ALP SERPL-CCNC: 75 U/L (ref 40–129)
ALT SERPL-CCNC: 11 U/L (ref 10–40)
ANION GAP SERPL CALCULATED.3IONS-SCNC: 15 MMOL/L (ref 3–16)
AST SERPL-CCNC: 18 U/L (ref 15–37)
BILIRUB SERPL-MCNC: 0.4 MG/DL (ref 0–1)
BUN SERPL-MCNC: 11 MG/DL (ref 7–20)
CALCIUM SERPL-MCNC: 9.9 MG/DL (ref 8.3–10.6)
CHLORIDE SERPL-SCNC: 102 MMOL/L (ref 99–110)
CHOLEST SERPL-MCNC: 182 MG/DL (ref 0–199)
CO2 SERPL-SCNC: 24 MMOL/L (ref 21–32)
CREAT SERPL-MCNC: 0.8 MG/DL (ref 0.6–1.2)
EST. AVERAGE GLUCOSE BLD GHB EST-MCNC: 122.6 MG/DL
GFR SERPLBLD CREATININE-BSD FMLA CKD-EPI: >60 ML/MIN/{1.73_M2}
GLUCOSE P FAST SERPL-MCNC: 114 MG/DL (ref 70–99)
HBA1C MFR BLD: 5.9 %
HDLC SERPL-MCNC: 46 MG/DL (ref 40–60)
LDLC SERPL CALC-MCNC: 115 MG/DL
POTASSIUM SERPL-SCNC: 4.6 MMOL/L (ref 3.5–5.1)
PROT SERPL-MCNC: 7.1 G/DL (ref 6.4–8.2)
SODIUM SERPL-SCNC: 141 MMOL/L (ref 136–145)
TRIGL SERPL-MCNC: 107 MG/DL (ref 0–150)
VLDLC SERPL CALC-MCNC: 21 MG/DL

## 2023-08-04 ENCOUNTER — TELEPHONE (OUTPATIENT)
Dept: FAMILY MEDICINE CLINIC | Age: 68
End: 2023-08-04

## 2023-08-04 DIAGNOSIS — M54.50 MIDLINE LOW BACK PAIN WITHOUT SCIATICA, UNSPECIFIED CHRONICITY: Primary | ICD-10-CM

## 2023-08-04 NOTE — TELEPHONE ENCOUNTER
----- Message from Lindsay Keating sent at 8/4/2023 12:19 PM EDT -----  Subject: Message to Provider    QUESTIONS  Information for Provider? Patient was prescribed ibprofin and it did not   work for the pain. She wants to know if she needs to get xrays. Please   contact the patient for next steps.   ---------------------------------------------------------------------------  --------------  Sonia Whitlock INFO  [phone]; OK to leave message on voicemail  ---------------------------------------------------------------------------  --------------  SCRIPT ANSWERS  Relationship to Patient?  Self

## 2023-08-07 ENCOUNTER — TELEPHONE (OUTPATIENT)
Dept: FAMILY MEDICINE CLINIC | Age: 68
End: 2023-08-07

## 2023-08-07 ENCOUNTER — HOSPITAL ENCOUNTER (OUTPATIENT)
Dept: GENERAL RADIOLOGY | Age: 68
Discharge: HOME | End: 2023-08-07
Payer: MEDICARE

## 2023-08-07 ENCOUNTER — HOSPITAL ENCOUNTER (OUTPATIENT)
Age: 68
Discharge: HOME | End: 2023-08-07
Payer: MEDICARE

## 2023-08-07 DIAGNOSIS — M54.50 MIDLINE LOW BACK PAIN WITHOUT SCIATICA, UNSPECIFIED CHRONICITY: ICD-10-CM

## 2023-08-07 DIAGNOSIS — M54.50 MIDLINE LOW BACK PAIN WITHOUT SCIATICA, UNSPECIFIED CHRONICITY: Primary | ICD-10-CM

## 2023-08-07 PROCEDURE — 73502 X-RAY EXAM HIP UNI 2-3 VIEWS: CPT

## 2023-08-07 PROCEDURE — 72110 X-RAY EXAM L-2 SPINE 4/>VWS: CPT

## 2023-08-07 PROCEDURE — 73030 X-RAY EXAM OF SHOULDER: CPT

## 2023-08-07 PROCEDURE — 73060 X-RAY EXAM OF HUMERUS: CPT

## 2023-08-07 NOTE — TELEPHONE ENCOUNTER
Che Rojas from [facility] [place] called and is requesting a order fpr the right hip. [phone]. please advise patient is currently waiting for the order to be placed.

## 2023-08-31 ENCOUNTER — OFFICE VISIT (OUTPATIENT)
Dept: FAMILY MEDICINE CLINIC | Age: 68
End: 2023-08-31
Payer: MEDICARE

## 2023-08-31 VITALS
BODY MASS INDEX: 46.32 KG/M2 | TEMPERATURE: 98 F | WEIGHT: 278 LBS | DIASTOLIC BLOOD PRESSURE: 65 MMHG | HEART RATE: 74 BPM | HEIGHT: 65 IN | OXYGEN SATURATION: 97 % | SYSTOLIC BLOOD PRESSURE: 112 MMHG | RESPIRATION RATE: 16 BRPM

## 2023-08-31 DIAGNOSIS — M77.8 LEFT SHOULDER TENDONITIS: Primary | ICD-10-CM

## 2023-08-31 DIAGNOSIS — M70.62 GREATER TROCHANTERIC BURSITIS OF LEFT HIP: ICD-10-CM

## 2023-08-31 PROCEDURE — 3017F COLORECTAL CA SCREEN DOC REV: CPT | Performed by: FAMILY MEDICINE

## 2023-08-31 PROCEDURE — 1090F PRES/ABSN URINE INCON ASSESS: CPT | Performed by: FAMILY MEDICINE

## 2023-08-31 PROCEDURE — 1123F ACP DISCUSS/DSCN MKR DOCD: CPT | Performed by: FAMILY MEDICINE

## 2023-08-31 PROCEDURE — G8399 PT W/DXA RESULTS DOCUMENT: HCPCS | Performed by: FAMILY MEDICINE

## 2023-08-31 PROCEDURE — 1036F TOBACCO NON-USER: CPT | Performed by: FAMILY MEDICINE

## 2023-08-31 PROCEDURE — G8427 DOCREV CUR MEDS BY ELIG CLIN: HCPCS | Performed by: FAMILY MEDICINE

## 2023-08-31 PROCEDURE — 99214 OFFICE O/P EST MOD 30 MIN: CPT | Performed by: FAMILY MEDICINE

## 2023-08-31 PROCEDURE — G8417 CALC BMI ABV UP PARAM F/U: HCPCS | Performed by: FAMILY MEDICINE

## 2023-08-31 RX ORDER — DOXYCYCLINE HYCLATE 100 MG
100 TABLET ORAL 2 TIMES DAILY
Qty: 20 TABLET | Refills: 0 | Status: SHIPPED | OUTPATIENT
Start: 2023-08-31 | End: 2023-09-10

## 2023-08-31 SDOH — ECONOMIC STABILITY: FOOD INSECURITY: WITHIN THE PAST 12 MONTHS, YOU WORRIED THAT YOUR FOOD WOULD RUN OUT BEFORE YOU GOT MONEY TO BUY MORE.: NEVER TRUE

## 2023-08-31 SDOH — ECONOMIC STABILITY: FOOD INSECURITY: WITHIN THE PAST 12 MONTHS, THE FOOD YOU BOUGHT JUST DIDN'T LAST AND YOU DIDN'T HAVE MONEY TO GET MORE.: NEVER TRUE

## 2023-08-31 SDOH — ECONOMIC STABILITY: INCOME INSECURITY: HOW HARD IS IT FOR YOU TO PAY FOR THE VERY BASICS LIKE FOOD, HOUSING, MEDICAL CARE, AND HEATING?: NOT HARD AT ALL

## 2023-08-31 SDOH — ECONOMIC STABILITY: HOUSING INSECURITY
IN THE LAST 12 MONTHS, WAS THERE A TIME WHEN YOU DID NOT HAVE A STEADY PLACE TO SLEEP OR SLEPT IN A SHELTER (INCLUDING NOW)?: NO

## 2023-10-11 ENCOUNTER — OFFICE VISIT (OUTPATIENT)
Dept: FAMILY MEDICINE CLINIC | Age: 68
End: 2023-10-11
Payer: MEDICARE

## 2023-10-11 VITALS
OXYGEN SATURATION: 98 % | BODY MASS INDEX: 45.26 KG/M2 | SYSTOLIC BLOOD PRESSURE: 114 MMHG | DIASTOLIC BLOOD PRESSURE: 79 MMHG | WEIGHT: 272 LBS | HEART RATE: 77 BPM

## 2023-10-11 DIAGNOSIS — E66.01 CLASS 3 SEVERE OBESITY DUE TO EXCESS CALORIES WITHOUT SERIOUS COMORBIDITY WITH BODY MASS INDEX (BMI) OF 40.0 TO 44.9 IN ADULT (HCC): ICD-10-CM

## 2023-10-11 DIAGNOSIS — C44.319 BASAL CELL CARCINOMA OF RIGHT FOREHEAD: ICD-10-CM

## 2023-10-11 DIAGNOSIS — E55.9 VITAMIN D DEFICIENCY: ICD-10-CM

## 2023-10-11 DIAGNOSIS — Z00.00 INITIAL MEDICARE ANNUAL WELLNESS VISIT: Primary | ICD-10-CM

## 2023-10-11 PROCEDURE — 3017F COLORECTAL CA SCREEN DOC REV: CPT | Performed by: FAMILY MEDICINE

## 2023-10-11 PROCEDURE — G8484 FLU IMMUNIZE NO ADMIN: HCPCS | Performed by: FAMILY MEDICINE

## 2023-10-11 PROCEDURE — 1123F ACP DISCUSS/DSCN MKR DOCD: CPT | Performed by: FAMILY MEDICINE

## 2023-10-11 PROCEDURE — G0438 PPPS, INITIAL VISIT: HCPCS | Performed by: FAMILY MEDICINE

## 2023-10-11 ASSESSMENT — PATIENT HEALTH QUESTIONNAIRE - PHQ9
SUM OF ALL RESPONSES TO PHQ QUESTIONS 1-9: 0
1. LITTLE INTEREST OR PLEASURE IN DOING THINGS: 0
2. FEELING DOWN, DEPRESSED OR HOPELESS: 0
SUM OF ALL RESPONSES TO PHQ QUESTIONS 1-9: 0
SUM OF ALL RESPONSES TO PHQ QUESTIONS 1-9: 0
SUM OF ALL RESPONSES TO PHQ9 QUESTIONS 1 & 2: 0
SUM OF ALL RESPONSES TO PHQ QUESTIONS 1-9: 0

## 2023-10-11 ASSESSMENT — LIFESTYLE VARIABLES: HOW OFTEN DO YOU HAVE A DRINK CONTAINING ALCOHOL: NEVER

## 2023-11-20 ENCOUNTER — TELEPHONE (OUTPATIENT)
Dept: FAMILY MEDICINE CLINIC | Age: 68
End: 2023-11-20

## 2023-11-20 ENCOUNTER — HOSPITAL ENCOUNTER (OUTPATIENT)
Dept: ULTRASOUND IMAGING | Age: 68
Discharge: HOME | End: 2023-11-20
Attending: FAMILY MEDICINE
Payer: MEDICAID

## 2023-11-20 DIAGNOSIS — N93.9 VAGINAL BLEEDING: ICD-10-CM

## 2023-11-20 DIAGNOSIS — N93.9 VAGINAL BLEEDING: Primary | ICD-10-CM

## 2023-11-20 DIAGNOSIS — N95.0 POSTMENOPAUSAL VAGINAL BLEEDING: Primary | ICD-10-CM

## 2023-11-20 PROCEDURE — 76856 US EXAM PELVIC COMPLETE: CPT

## 2023-11-20 NOTE — TELEPHONE ENCOUNTER
Patient went to ED for vaginal bleeding(info in Epic)  Patient states an US was not done due to not having a tech at ED yesterday  Patient is asking if the [address] can be marked STAT so she can have done ASAP  Contact patient

## 2023-11-21 ENCOUNTER — TELEPHONE (OUTPATIENT)
Dept: GYNECOLOGY | Age: 68
End: 2023-11-21

## 2023-11-21 ENCOUNTER — OFFICE VISIT (OUTPATIENT)
Dept: GYNECOLOGY | Age: 68
End: 2023-11-21
Payer: MEDICAID

## 2023-11-21 VITALS
SYSTOLIC BLOOD PRESSURE: 106 MMHG | DIASTOLIC BLOOD PRESSURE: 74 MMHG | BODY MASS INDEX: 45.53 KG/M2 | WEIGHT: 273.6 LBS | OXYGEN SATURATION: 99 % | HEART RATE: 76 BPM

## 2023-11-21 DIAGNOSIS — Z87.42 HISTORY OF ABNORMAL CERVICAL PAP SMEAR: ICD-10-CM

## 2023-11-21 DIAGNOSIS — N95.0 PMB (POSTMENOPAUSAL BLEEDING): Primary | ICD-10-CM

## 2023-11-21 PROCEDURE — 99204 OFFICE O/P NEW MOD 45 MIN: CPT | Performed by: OBSTETRICS & GYNECOLOGY

## 2023-11-21 PROCEDURE — 1123F ACP DISCUSS/DSCN MKR DOCD: CPT | Performed by: OBSTETRICS & GYNECOLOGY

## 2023-11-21 RX ORDER — SODIUM CHLORIDE 0.9 % (FLUSH) 0.9 %
5-40 SYRINGE (ML) INJECTION PRN
OUTPATIENT
Start: 2023-11-21

## 2023-11-21 RX ORDER — SODIUM CHLORIDE, SODIUM LACTATE, POTASSIUM CHLORIDE, CALCIUM CHLORIDE 600; 310; 30; 20 MG/100ML; MG/100ML; MG/100ML; MG/100ML
INJECTION, SOLUTION INTRAVENOUS CONTINUOUS
OUTPATIENT
Start: 2023-11-21

## 2023-11-21 RX ORDER — SODIUM CHLORIDE 0.9 % (FLUSH) 0.9 %
5-40 SYRINGE (ML) INJECTION EVERY 12 HOURS SCHEDULED
OUTPATIENT
Start: 2023-11-21

## 2023-11-24 LAB
HPV HR 12 DNA SPEC QL NAA+PROBE: NOT DETECTED
HPV16 DNA SPEC QL NAA+PROBE: NOT DETECTED
HPV16+18+H RISK 12 DNA SPEC-IMP: NORMAL
HPV18 DNA SPEC QL NAA+PROBE: NOT DETECTED

## 2023-11-28 NOTE — TELEPHONE ENCOUNTER
Surgery is scheduled for 1/25/2024 at 1230. Pt should arrive at 10:30am.     Called pt to advise. Also advised of need for a preop physical.   Sent letter with detailed directions.

## 2024-01-04 ENCOUNTER — OFFICE VISIT (OUTPATIENT)
Dept: FAMILY MEDICINE CLINIC | Age: 69
End: 2024-01-04
Payer: MEDICARE

## 2024-01-04 VITALS
DIASTOLIC BLOOD PRESSURE: 80 MMHG | WEIGHT: 274.2 LBS | SYSTOLIC BLOOD PRESSURE: 120 MMHG | BODY MASS INDEX: 45.68 KG/M2 | OXYGEN SATURATION: 99 % | HEIGHT: 65 IN | HEART RATE: 82 BPM

## 2024-01-04 DIAGNOSIS — Z01.811 PRE-OP CHEST EXAM: Primary | ICD-10-CM

## 2024-01-04 PROCEDURE — 1036F TOBACCO NON-USER: CPT | Performed by: FAMILY MEDICINE

## 2024-01-04 PROCEDURE — G8399 PT W/DXA RESULTS DOCUMENT: HCPCS | Performed by: FAMILY MEDICINE

## 2024-01-04 PROCEDURE — 93000 ELECTROCARDIOGRAM COMPLETE: CPT | Performed by: FAMILY MEDICINE

## 2024-01-04 PROCEDURE — G8417 CALC BMI ABV UP PARAM F/U: HCPCS | Performed by: FAMILY MEDICINE

## 2024-01-04 PROCEDURE — G8427 DOCREV CUR MEDS BY ELIG CLIN: HCPCS | Performed by: FAMILY MEDICINE

## 2024-01-04 PROCEDURE — 1123F ACP DISCUSS/DSCN MKR DOCD: CPT | Performed by: FAMILY MEDICINE

## 2024-01-04 PROCEDURE — 1090F PRES/ABSN URINE INCON ASSESS: CPT | Performed by: FAMILY MEDICINE

## 2024-01-04 PROCEDURE — 99214 OFFICE O/P EST MOD 30 MIN: CPT | Performed by: FAMILY MEDICINE

## 2024-01-04 PROCEDURE — 3017F COLORECTAL CA SCREEN DOC REV: CPT | Performed by: FAMILY MEDICINE

## 2024-01-04 PROCEDURE — G8484 FLU IMMUNIZE NO ADMIN: HCPCS | Performed by: FAMILY MEDICINE

## 2024-01-04 ASSESSMENT — PATIENT HEALTH QUESTIONNAIRE - PHQ9
2. FEELING DOWN, DEPRESSED OR HOPELESS: 0
1. LITTLE INTEREST OR PLEASURE IN DOING THINGS: 0
SUM OF ALL RESPONSES TO PHQ QUESTIONS 1-9: 0
SUM OF ALL RESPONSES TO PHQ QUESTIONS 1-9: 0
SUM OF ALL RESPONSES TO PHQ9 QUESTIONS 1 & 2: 0
SUM OF ALL RESPONSES TO PHQ QUESTIONS 1-9: 0
SUM OF ALL RESPONSES TO PHQ QUESTIONS 1-9: 0

## 2024-01-04 NOTE — PROGRESS NOTES
EXAMINATION:  /80   Pulse 82   Ht 1.651 m (5' 5\")   Wt 124.4 kg (274 lb 3.2 oz)   SpO2 99%   BMI 45.63 kg/m²   General appearance - healthy, alert, no distress. obese  Skin - Skin color, texture, turgor normal. No rashes or lesions.  Head - Normocephalic. No masses, lesions, tenderness or abnormalities  Eyes - conjunctivae/corneas clear. PERRL, EOM's intact.  Ears - External ears normal. Canals clear. TM's normal. Hearing grossly intact to finger rub.  Nose/Sinuses - Nares normal. Septum midline. Mucosa normal. No drainage or sinus tenderness.  Oropharynx - Lips, mucosa, and tongue normal. Teeth and gums normal. Orop  Neck - Neck supple. No adenopathy. Thyroid symmetric, normal size. No bruits.  Back - Back symmetric, no curvature. ROM normal. No CVA tenderness.  Lungs - Percussion normal. Good diaphragmatic excursion. Lungs clear  Heart - Regular rate and rhythm, with no rub, murmur or gallop noted.  Abdomen - Abdomen soft, non-tender. BS normal. No masses, organomegaly  Extremities - Extremities normal. No deformities, edema, or skin discolora  Musculoskeletal - Spine ROM normal. Muscular strength intact.  Peripheral pulses - radial=4/4, dorsalis pedis=4/4,  Neuro - Gait normal. Reflexes normal and symmetric. Sensation grossly normal.  No focal weakness  Lab Results   Component Value Date     07/21/2023    K 4.6 07/21/2023     07/21/2023    CO2 24 07/21/2023    BUN 11 07/21/2023    CREATININE 0.8 07/21/2023    GLUCOSE 118 (H) 05/31/2022    CALCIUM 9.9 07/21/2023    PROT 7.1 07/21/2023    LABALBU 4.4 07/21/2023    BILITOT 0.4 07/21/2023    ALKPHOS 75 07/21/2023    AST 18 07/21/2023    ALT 11 07/21/2023    LABGLOM >60 07/21/2023    GFRAA >60 07/07/2022    AGRATIO 1.6 07/21/2023    GLOB 2.4 04/09/2021        Lab Results   Component Value Date    WBC 7.8 07/21/2023    HGB 14.2 07/21/2023    HCT 41.7 07/21/2023    MCV 96.7 07/21/2023     07/21/2023      EKG -

## 2024-01-17 RX ORDER — NABUMETONE 500 MG/1
TABLET, FILM COATED ORAL
COMMUNITY
Start: 2023-09-01 | End: 2024-01-19

## 2024-01-24 ENCOUNTER — ANESTHESIA EVENT (OUTPATIENT)
Dept: OPERATING ROOM | Age: 69
End: 2024-01-24
Payer: MEDICAID

## 2024-01-24 NOTE — OP NOTE
Operative Note      Patient: Emily Barone  YOB: 1955  MRN: [id number]    Date of Procedure: 1/25/2024    Pre-Op Diagnosis Codes:     * Postmenopausal bleeding [N95.0]  and cervical os stenosis    Post-Op Diagnosis: Same       Procedure(s):  HYSTEROSCOPY WITH DILATATION AND CURETTAGE    Surgeon(s):  Von Cooper MD    ANESTHESIA: GET    INDICATIONS: Emily Barone is a 69 y.o. female who has postmenopausal bleeding and cervical os stenosis.  She is known to have a small uterus based on ultrasound findings.  She desires the proposed procedure.  She declines expectant management or medical management or alternative therapies. She was made aware of the risks of the procedure including but not limited to bleeding, blood clot, infection, uterine perforation, damage to internal organs, need for additional surgery, failure to diagnose. All questions were answered and she wished to proceed.     OPERATIVE FINDINGS: Cervical os stenosis.  Small uterus.      TECHNIQUE: The patient was taken to the operating room and placed in supine position. Anesthesia was administered by anesthesia department. She was then placed in modified dorsal lithotomy position in candy cane stirrups.  Examination was performed. She was then prepped and draped in usual sterile fashion for the proposed procedure. Time-out was taken to positively identify the patient and confirm the procedure and confirm that the patient received prophylactic antibiotics. Pre-op briefing with the OR team was done as per the surgeon's routine.    A speculum was placed in the vagina. The anterior lip of the cervix was grasped with an Allis clamp.  Attempted gentle cervical dilation.   Unable to maintain grasp on the anterior lip of the cervix.  A single-tooth tenaculum was placed on the cervix and again additional attempts at gentle cervical dilation was performed.  Additional stabilization with a second single-tooth tenaculum was performed on the

## 2024-01-24 NOTE — DISCHARGE INSTRUCTIONS
Activity:  You may feel sleepy for the next 24 hours. This is due to the medication you received during your procedure. You should have a responsible adult with you for the rest of the day and during the night. This is for your own safety and protection. You may be up and about according to your instructions. You should be urinating every 4-6 hours as needed.    You may use stairs.  You may shower.  Nothing in the vagina: no sex or tampons for 4 weeks.    For the next 24 hours, you should:  Not drive a car  Not operate machinery or power tools  Not drink alcoholic beverages, including beer  Not make any important decisions or sign important papers     The following restrictions should be observed:  Take showers instead of tub baths  No hot tubs, whirlpools, bath tubs, swimming pools  No sexual activity until advised  Rest for the day, then resume normal activity as you are able  No tampons or douching until advised    What to expect  Cramping  Bleeding (intermittently up to 2-3 weeks)  Some soreness in legs and back     Diet:  Progress slowly to a regular diet, especially if you have had a general anesthetic. Start by taking liquids. If you have no nausea, try soup and crackers, then solid food.     Medications:  You may have some pain and or lower abdominal cramping.   Take medication with food to prevent an upset stomach.    You can use ibuprofen 600 mg (3 regular strength tablets) every 6 hours as needed for pain.    You can use Tylenol 1000 mg (2 extra strength tablets) every 6 hours as needed for pain with the ibuprofen.    A prescription for doxycycline, an antibiotic to help prevent infection, was sent to your pharmacy.  You will take this twice a day for the next 3 days, starting today.    Follow-up in the office with Dr. Cooper in 2 weeks.    When to call:  If you have a temperature of 100.4 degrees Fahrenheit orally  If you have severe pain or cramping  If you have bleeding heavier than your normal

## 2024-01-24 NOTE — H&P
Update History & Physical    The patient's History and Physical of January 4, 2024 was reviewed with the patient and I examined the patient. There was no change. The surgical site was confirmed by the patient and me.       Plan: The risks, benefits, expected outcome, and alternative to the recommended procedure have been discussed with the patient. Patient understands and wants to proceed with the procedure.

## 2024-01-25 ENCOUNTER — ANESTHESIA (OUTPATIENT)
Dept: OPERATING ROOM | Age: 69
End: 2024-01-25
Payer: MEDICAID

## 2024-01-25 ENCOUNTER — HOSPITAL ENCOUNTER (OUTPATIENT)
Age: 69
Setting detail: OUTPATIENT SURGERY
Discharge: HOME | End: 2024-01-25
Attending: OBSTETRICS & GYNECOLOGY | Admitting: OBSTETRICS & GYNECOLOGY
Payer: MEDICAID

## 2024-01-25 VITALS
OXYGEN SATURATION: 91 % | WEIGHT: 278.8 LBS | DIASTOLIC BLOOD PRESSURE: 91 MMHG | SYSTOLIC BLOOD PRESSURE: 149 MMHG | BODY MASS INDEX: 46.45 KG/M2 | HEIGHT: 65 IN | TEMPERATURE: 96.9 F | HEART RATE: 75 BPM | RESPIRATION RATE: 14 BRPM

## 2024-01-25 PROCEDURE — 3600000004 HC SURGERY LEVEL 4 BASE: Performed by: OBSTETRICS & GYNECOLOGY

## 2024-01-25 PROCEDURE — 2580000003 HC RX 258: Performed by: OBSTETRICS & GYNECOLOGY

## 2024-01-25 PROCEDURE — 6360000002 HC RX W HCPCS: Performed by: OBSTETRICS & GYNECOLOGY

## 2024-01-25 PROCEDURE — 3600000014 HC SURGERY LEVEL 4 ADDTL 15MIN: Performed by: OBSTETRICS & GYNECOLOGY

## 2024-01-25 PROCEDURE — 2580000003 HC RX 258

## 2024-01-25 PROCEDURE — 7100000000 HC PACU RECOVERY - FIRST 15 MIN: Performed by: OBSTETRICS & GYNECOLOGY

## 2024-01-25 PROCEDURE — 3700000001 HC ADD 15 MINUTES (ANESTHESIA): Performed by: OBSTETRICS & GYNECOLOGY

## 2024-01-25 PROCEDURE — 7100000011 HC PHASE II RECOVERY - ADDTL 15 MIN: Performed by: OBSTETRICS & GYNECOLOGY

## 2024-01-25 PROCEDURE — 7100000010 HC PHASE II RECOVERY - FIRST 15 MIN: Performed by: OBSTETRICS & GYNECOLOGY

## 2024-01-25 PROCEDURE — 6360000002 HC RX W HCPCS

## 2024-01-25 PROCEDURE — 2709999900 HC NON-CHARGEABLE SUPPLY: Performed by: OBSTETRICS & GYNECOLOGY

## 2024-01-25 PROCEDURE — 3700000000 HC ANESTHESIA ATTENDED CARE: Performed by: OBSTETRICS & GYNECOLOGY

## 2024-01-25 PROCEDURE — 2500000003 HC RX 250 WO HCPCS

## 2024-01-25 PROCEDURE — 7100000001 HC PACU RECOVERY - ADDTL 15 MIN: Performed by: OBSTETRICS & GYNECOLOGY

## 2024-01-25 RX ORDER — HYDROMORPHONE HYDROCHLORIDE 1 MG/ML
0.5 INJECTION, SOLUTION INTRAMUSCULAR; INTRAVENOUS; SUBCUTANEOUS EVERY 5 MIN PRN
Status: DISCONTINUED | OUTPATIENT
Start: 2024-01-25 | End: 2024-01-25 | Stop reason: HOSPADM

## 2024-01-25 RX ORDER — LIDOCAINE HYDROCHLORIDE 20 MG/ML
INJECTION, SOLUTION INTRAVENOUS PRN
Status: DISCONTINUED | OUTPATIENT
Start: 2024-01-25 | End: 2024-01-25 | Stop reason: SDUPTHER

## 2024-01-25 RX ORDER — OXYCODONE HYDROCHLORIDE 5 MG/1
5 TABLET ORAL
Status: DISCONTINUED | OUTPATIENT
Start: 2024-01-25 | End: 2024-01-25 | Stop reason: HOSPADM

## 2024-01-25 RX ORDER — FENTANYL CITRATE 50 UG/ML
INJECTION, SOLUTION INTRAMUSCULAR; INTRAVENOUS PRN
Status: DISCONTINUED | OUTPATIENT
Start: 2024-01-25 | End: 2024-01-25 | Stop reason: SDUPTHER

## 2024-01-25 RX ORDER — SUCCINYLCHOLINE CHLORIDE 20 MG/ML
INJECTION INTRAMUSCULAR; INTRAVENOUS PRN
Status: DISCONTINUED | OUTPATIENT
Start: 2024-01-25 | End: 2024-01-25 | Stop reason: SDUPTHER

## 2024-01-25 RX ORDER — SODIUM CHLORIDE, SODIUM LACTATE, POTASSIUM CHLORIDE, CALCIUM CHLORIDE 600; 310; 30; 20 MG/100ML; MG/100ML; MG/100ML; MG/100ML
INJECTION, SOLUTION INTRAVENOUS CONTINUOUS
Status: DISCONTINUED | OUTPATIENT
Start: 2024-01-25 | End: 2024-01-25 | Stop reason: HOSPADM

## 2024-01-25 RX ORDER — ONDANSETRON 2 MG/ML
INJECTION INTRAMUSCULAR; INTRAVENOUS PRN
Status: DISCONTINUED | OUTPATIENT
Start: 2024-01-25 | End: 2024-01-25 | Stop reason: SDUPTHER

## 2024-01-25 RX ORDER — MIDAZOLAM HYDROCHLORIDE 1 MG/ML
INJECTION INTRAMUSCULAR; INTRAVENOUS PRN
Status: DISCONTINUED | OUTPATIENT
Start: 2024-01-25 | End: 2024-01-25 | Stop reason: SDUPTHER

## 2024-01-25 RX ORDER — SODIUM CHLORIDE 9 MG/ML
INJECTION, SOLUTION INTRAVENOUS PRN
Status: DISCONTINUED | OUTPATIENT
Start: 2024-01-25 | End: 2024-01-25 | Stop reason: HOSPADM

## 2024-01-25 RX ORDER — SODIUM CHLORIDE 0.9 % (FLUSH) 0.9 %
5-40 SYRINGE (ML) INJECTION EVERY 12 HOURS SCHEDULED
Status: DISCONTINUED | OUTPATIENT
Start: 2024-01-25 | End: 2024-01-25 | Stop reason: HOSPADM

## 2024-01-25 RX ORDER — GLYCOPYRROLATE 0.2 MG/ML
INJECTION INTRAMUSCULAR; INTRAVENOUS PRN
Status: DISCONTINUED | OUTPATIENT
Start: 2024-01-25 | End: 2024-01-25 | Stop reason: SDUPTHER

## 2024-01-25 RX ORDER — PROPOFOL 10 MG/ML
INJECTION, EMULSION INTRAVENOUS PRN
Status: DISCONTINUED | OUTPATIENT
Start: 2024-01-25 | End: 2024-01-25 | Stop reason: SDUPTHER

## 2024-01-25 RX ORDER — HYDRALAZINE HYDROCHLORIDE 20 MG/ML
10 INJECTION INTRAMUSCULAR; INTRAVENOUS
Status: DISCONTINUED | OUTPATIENT
Start: 2024-01-25 | End: 2024-01-25 | Stop reason: HOSPADM

## 2024-01-25 RX ORDER — SODIUM CHLORIDE 0.9 % (FLUSH) 0.9 %
5-40 SYRINGE (ML) INJECTION PRN
Status: DISCONTINUED | OUTPATIENT
Start: 2024-01-25 | End: 2024-01-25 | Stop reason: HOSPADM

## 2024-01-25 RX ORDER — KETOROLAC TROMETHAMINE 15 MG/ML
INJECTION, SOLUTION INTRAMUSCULAR; INTRAVENOUS PRN
Status: DISCONTINUED | OUTPATIENT
Start: 2024-01-25 | End: 2024-01-25 | Stop reason: SDUPTHER

## 2024-01-25 RX ORDER — DEXAMETHASONE SODIUM PHOSPHATE 10 MG/ML
INJECTION, SOLUTION INTRAMUSCULAR; INTRAVENOUS PRN
Status: DISCONTINUED | OUTPATIENT
Start: 2024-01-25 | End: 2024-01-25 | Stop reason: SDUPTHER

## 2024-01-25 RX ORDER — KETAMINE HCL IN NACL, ISO-OSM 20 MG/2 ML
SYRINGE (ML) INJECTION PRN
Status: DISCONTINUED | OUTPATIENT
Start: 2024-01-25 | End: 2024-01-25 | Stop reason: SDUPTHER

## 2024-01-25 RX ORDER — ONDANSETRON 2 MG/ML
4 INJECTION INTRAMUSCULAR; INTRAVENOUS
Status: DISCONTINUED | OUTPATIENT
Start: 2024-01-25 | End: 2024-01-25 | Stop reason: HOSPADM

## 2024-01-25 RX ORDER — DOXYCYCLINE HYCLATE 100 MG
100 TABLET ORAL 2 TIMES DAILY
Qty: 6 TABLET | Refills: 0 | Status: SHIPPED | OUTPATIENT
Start: 2024-01-25 | End: 2024-01-28

## 2024-01-25 RX ORDER — PROCHLORPERAZINE EDISYLATE 5 MG/ML
5 INJECTION INTRAMUSCULAR; INTRAVENOUS
Status: DISCONTINUED | OUTPATIENT
Start: 2024-01-25 | End: 2024-01-25 | Stop reason: HOSPADM

## 2024-01-25 RX ORDER — LABETALOL HYDROCHLORIDE 5 MG/ML
10 INJECTION, SOLUTION INTRAVENOUS
Status: DISCONTINUED | OUTPATIENT
Start: 2024-01-25 | End: 2024-01-25 | Stop reason: HOSPADM

## 2024-01-25 RX ORDER — MEPERIDINE HYDROCHLORIDE 25 MG/ML
12.5 INJECTION INTRAMUSCULAR; INTRAVENOUS; SUBCUTANEOUS EVERY 5 MIN PRN
Status: DISCONTINUED | OUTPATIENT
Start: 2024-01-25 | End: 2024-01-25 | Stop reason: HOSPADM

## 2024-01-25 RX ORDER — DEXAMETHASONE SODIUM PHOSPHATE 4 MG/ML
INJECTION, SOLUTION INTRA-ARTICULAR; INTRALESIONAL; INTRAMUSCULAR; INTRAVENOUS; SOFT TISSUE PRN
Status: DISCONTINUED | OUTPATIENT
Start: 2024-01-25 | End: 2024-01-25

## 2024-01-25 RX ADMIN — KETOROLAC TROMETHAMINE 15 MG: 15 INJECTION, SOLUTION INTRAMUSCULAR; INTRAVENOUS at 11:19

## 2024-01-25 RX ADMIN — DEXMEDETOMIDINE HYDROCHLORIDE 4 MCG: 100 INJECTION, SOLUTION INTRAVENOUS at 11:21

## 2024-01-25 RX ADMIN — Medication 20 MG: at 11:18

## 2024-01-25 RX ADMIN — DEXMEDETOMIDINE HYDROCHLORIDE 8 MCG: 100 INJECTION, SOLUTION INTRAVENOUS at 11:16

## 2024-01-25 RX ADMIN — GLYCOPYRROLATE 0.2 MG: 0.2 INJECTION INTRAMUSCULAR; INTRAVENOUS at 11:12

## 2024-01-25 RX ADMIN — PROPOFOL 200 MG: 10 INJECTION, EMULSION INTRAVENOUS at 10:51

## 2024-01-25 RX ADMIN — LIDOCAINE HYDROCHLORIDE 100 MG: 20 INJECTION, SOLUTION INTRAVENOUS at 10:51

## 2024-01-25 RX ADMIN — SODIUM CHLORIDE 3000 MG: 900 INJECTION INTRAVENOUS at 10:54

## 2024-01-25 RX ADMIN — PROPOFOL 50 MG: 10 INJECTION, EMULSION INTRAVENOUS at 10:54

## 2024-01-25 RX ADMIN — FENTANYL CITRATE 50 MCG: 50 INJECTION, SOLUTION INTRAMUSCULAR; INTRAVENOUS at 10:51

## 2024-01-25 RX ADMIN — SUCCINYLCHOLINE CHLORIDE 160 MG: 20 INJECTION, SOLUTION INTRAMUSCULAR; INTRAVENOUS; PARENTERAL at 10:56

## 2024-01-25 RX ADMIN — MIDAZOLAM HYDROCHLORIDE 2 MG: 2 INJECTION, SOLUTION INTRAMUSCULAR; INTRAVENOUS at 10:47

## 2024-01-25 RX ADMIN — DEXAMETHASONE SODIUM PHOSPHATE 10 MG: 10 INJECTION, SOLUTION INTRAMUSCULAR; INTRAVENOUS at 10:58

## 2024-01-25 RX ADMIN — DEXMEDETOMIDINE HYDROCHLORIDE 8 MCG: 100 INJECTION, SOLUTION INTRAVENOUS at 11:18

## 2024-01-25 RX ADMIN — SODIUM CHLORIDE, POTASSIUM CHLORIDE, SODIUM LACTATE AND CALCIUM CHLORIDE: 600; 310; 30; 20 INJECTION, SOLUTION INTRAVENOUS at 09:35

## 2024-01-25 RX ADMIN — ONDANSETRON 4 MG: 2 INJECTION INTRAMUSCULAR; INTRAVENOUS at 10:59

## 2024-01-25 RX ADMIN — FENTANYL CITRATE 50 MCG: 50 INJECTION, SOLUTION INTRAMUSCULAR; INTRAVENOUS at 10:53

## 2024-01-25 ASSESSMENT — PAIN SCALES - GENERAL: PAINLEVEL_OUTOF10: 0

## 2024-01-25 NOTE — FLOWSHEET NOTE
Vitals:    01/25/24 1222   BP: (!) 152/92   Pulse: 75   Resp: 14   Temp: 98.0   SpO2: 100%         Intake/Output Summary (Last 24 hours) at 1/25/2024 1226  Last data filed at 1/25/2024 1125  Gross per 24 hour   Intake 700 ml   Output 25 ml   Net 675 ml       Pain assessment:  none  Pain Level: 0    Patient transferred to care of [facility] RN.    1/25/2024 12:26 PM   pt transported on stretcher with belongings to [facility] per Jo, pacu transport

## 2024-01-25 NOTE — ANESTHESIA POSTPROCEDURE EVALUATION
Department of Anesthesiology  Postprocedure Note    Patient: Emily Barone  MRN: [id number]  YOB: 1955  Date of evaluation: 1/25/2024    Procedure Summary       Date: 01/25/24 Room / Location: [place] [address] / [facility]    Anesthesia Start: 1047 Anesthesia Stop: 1136    Procedure: HYSTEROSCOPY WITH DILATATION AND CURETTAGE Diagnosis:       Postmenopausal bleeding      (Postmenopausal bleeding [N95.0])    Surgeons: Von Cooper MD Responsible Provider: Romero Jeffries MD    Anesthesia Type: MAC ASA Status: 2            Anesthesia Type: No value filed.    Yoshi Phase I: Yoshi Score: 10    Yoshi Phase II:      Anesthesia Post Evaluation    Patient location during evaluation: PACU  Patient participation: complete - patient participated  Level of consciousness: awake and alert  Airway patency: patent  Nausea & Vomiting: no nausea and no vomiting  Cardiovascular status: hemodynamically stable  Respiratory status: acceptable  Hydration status: euvolemic  Multimodal analgesia pain management approach  Pain management: satisfactory to patient    No notable events documented.

## 2024-01-25 NOTE — PROGRESS NOTES
Ambulatory Surgery/Procedure Discharge Note    Vitals:    01/25/24 1229   BP: (!) 149/91   Pulse: 75   Resp:    Temp: 96.9 °F (36.1 °C)   SpO2: 91%     Phase 2 SDS ashutosh pad with scant light serous drainage, no pain, abd rd soft.      In: 700 [I.V.:600]  Out: 25     Restroom use offered before discharge.  Yes    Pain assessment:  none  Pain Level: 0    1255   reviewed d/c instructions with pt and  both verbalized their understanding    D/c IV  1256 up to bedside to get dressed  Pt changed her pad prior to getting dressed  1307  d/c to home with   Patient discharged to home/self care. Patient discharged via wheel chair by transporter to waiting family/S.O.       1/25/2024   
Arrived to [facility] see consent. NPO.               H/P  1/4/24 needs up-date  
with you on the day of your procedure.    10. If you use oxygen at home, please bring your oxygen tank with you to hospital..     11. We recommend that valuable personal belongings such as cash, cell phones, e-tablets, or jewelry, be left at home during your stay. The hospital will not be responsible for valuables that are not secured in the hospital safe. However, if your insurance requires a co-pay, you may want to bring a method of payment, i.e., Check or credit card, if you wish to pay your co-pay the day of surgery.      12. If you are to stay overnight, you may bring a bag with personal items. Please have any large items you may need brought in by your family after your arrival to your hospital room.    13. If you have a Living Will or Durable Power of , please bring a copy on the day of your procedure.     How we keep you safe and work to prevent surgical site infections:   1. Health care workers should always check your ID bracelet to verify your name and birth date. You will be asked many times to state your name, date of birth, and allergies.    2. Health care workers should always clean their hands with soap or alcohol gel before providing care to you. It is okay to ask anyone if they cleaned their hands before they touch you.    3. You will be actively involved in verifying the type of procedure you are having and ensuring the correct surgical site. This will be confirmed multiple times prior to your procedure. Do NOT marcus your surgery site UNLESS instructed to by your surgeon.     4. When you are in the operating room, your surgical site will be cleansed with a special soap, and in most cases, you will be given an antibiotic before the surgery begins.      What to expect AFTER your procedure?  1. Immediately following your procedure, your will be taken to the PACU for the first phase of your recovery.  Your nurse will help you recover from any potential side effects of anesthesia, such as

## 2024-01-26 ENCOUNTER — TELEPHONE (OUTPATIENT)
Dept: GYNECOLOGY | Age: 69
End: 2024-01-26

## 2024-01-26 NOTE — TELEPHONE ENCOUNTER
Please call patient and give follow-up/postop visit for 2 weeks after procedure    Spoke with patient.  Explained clinical scenario with stenotic cervix and false passage.  Is able to understand clinical situation and that endometrium was not sampled.  Discussed management options including expectant management with monitoring for bleeding as well as definitive surgery in the form of hysterectomy.  Patient is able to voiced understanding regarding risks and benefits of each.  All questions were answered.    She will follow-up in 2 weeks for her postoperative checkup.  We will review the above again with her as well as management options.

## 2024-01-26 NOTE — TELEPHONE ENCOUNTER
Called Emily. She said that the nurse wanted her to call Dr Cooper after the surgery yesterday. Please advise, thanks.

## 2024-01-26 NOTE — TELEPHONE ENCOUNTER
Patient is requesting to speak to someone from office regarding surgery    Patient can be reached at [phone]

## 2024-02-09 ENCOUNTER — OFFICE VISIT (OUTPATIENT)
Dept: GYNECOLOGY | Age: 69
End: 2024-02-09
Payer: MEDICARE

## 2024-02-09 VITALS
DIASTOLIC BLOOD PRESSURE: 64 MMHG | SYSTOLIC BLOOD PRESSURE: 130 MMHG | BODY MASS INDEX: 45.98 KG/M2 | WEIGHT: 276 LBS | HEIGHT: 65 IN

## 2024-02-09 DIAGNOSIS — N95.0 PMB (POSTMENOPAUSAL BLEEDING): Primary | ICD-10-CM

## 2024-02-09 PROCEDURE — 99214 OFFICE O/P EST MOD 30 MIN: CPT | Performed by: OBSTETRICS & GYNECOLOGY

## 2024-02-09 PROCEDURE — 3017F COLORECTAL CA SCREEN DOC REV: CPT | Performed by: OBSTETRICS & GYNECOLOGY

## 2024-02-09 PROCEDURE — G8484 FLU IMMUNIZE NO ADMIN: HCPCS | Performed by: OBSTETRICS & GYNECOLOGY

## 2024-02-09 PROCEDURE — 1124F ACP DISCUSS-NO DSCNMKR DOCD: CPT | Performed by: OBSTETRICS & GYNECOLOGY

## 2024-02-09 PROCEDURE — G8399 PT W/DXA RESULTS DOCUMENT: HCPCS | Performed by: OBSTETRICS & GYNECOLOGY

## 2024-02-09 PROCEDURE — G8427 DOCREV CUR MEDS BY ELIG CLIN: HCPCS | Performed by: OBSTETRICS & GYNECOLOGY

## 2024-02-09 PROCEDURE — 1036F TOBACCO NON-USER: CPT | Performed by: OBSTETRICS & GYNECOLOGY

## 2024-02-09 PROCEDURE — G8417 CALC BMI ABV UP PARAM F/U: HCPCS | Performed by: OBSTETRICS & GYNECOLOGY

## 2024-02-09 PROCEDURE — 1090F PRES/ABSN URINE INCON ASSESS: CPT | Performed by: OBSTETRICS & GYNECOLOGY

## 2024-02-09 NOTE — PROGRESS NOTES
Chief complaint: Post-Op Check    History of present illness: Emily Barone 69 y.o. female No LMP recorded. Patient is postmenopausal.  Emily Barone experienced postmenopausal bleeding.  Her primary care provider ordered a pelvic ultrasound which showed a 4 mm endometrial stripe and overall small uterus measuring 4.7 cm.  She was found to have atrophic changes and cervical os stenosis.  Findings and management options were reviewed and she elected hysteroscopy D&C.  Hysteroscopy D&C was performed on 1/25/2024.  Unfortunately, due to severe cervical os stenosis, we were unable to evaluate the endometrial cavity.  This was discussed with the patient postoperatively and again today.    The clinical significance of being able to evaluate the endometrial cavity were reviewed with her.  Management options at this point were reviewed and included expectant management, follow-up ultrasound evaluation of the endometrial stripe timing, and surgical evaluation with hysterectomy.  All of these options and risks were reviewed including risk of delay of diagnosis and progression of disease with worsening prognosis as well as risks of surgery.  The patient is able to voiced a good understanding regarding her clinical situation and management options and risk.  She would like to have follow-up ultrasound performed.  We discussed timing of this and the patient is agreeable to 6-month follow-up now and provided the endometrial stripe is not thickened, repeat again in 12 months from now.  She was informed to follow-up if she develops additional postmenopausal bleeding.  She voices understanding and intent to comply.    Patient Active Problem List   Diagnosis    Obesity    Vitamin D deficiency    Basal cell carcinoma of right forehead    Basal cell carcinoma    Abscess of buttock, right    Pain in lower jaw    Sensorineural hearing loss, bilateral    Elevated hemoglobin A1c    Diverticulosis       Review of systems:  No complaints of

## 2024-02-16 ENCOUNTER — OFFICE VISIT (OUTPATIENT)
Dept: FAMILY MEDICINE CLINIC | Age: 69
End: 2024-02-16

## 2024-02-16 VITALS
DIASTOLIC BLOOD PRESSURE: 85 MMHG | WEIGHT: 277 LBS | SYSTOLIC BLOOD PRESSURE: 134 MMHG | HEIGHT: 65 IN | BODY MASS INDEX: 46.15 KG/M2 | HEART RATE: 75 BPM | OXYGEN SATURATION: 96 %

## 2024-02-16 DIAGNOSIS — R06.83 SNORES: ICD-10-CM

## 2024-02-16 DIAGNOSIS — R03.0 ELEVATED BLOOD PRESSURE READING: ICD-10-CM

## 2024-02-16 DIAGNOSIS — L29.9 ITCHING OF EAR: Primary | ICD-10-CM

## 2024-02-16 DIAGNOSIS — E66.01 CLASS 3 SEVERE OBESITY WITHOUT SERIOUS COMORBIDITY WITH BODY MASS INDEX (BMI) OF 45.0 TO 49.9 IN ADULT, UNSPECIFIED OBESITY TYPE (HCC): ICD-10-CM

## 2024-02-16 DIAGNOSIS — G47.9 SLEEP DIFFICULTIES: ICD-10-CM

## 2024-02-16 RX ORDER — BLOOD PRESSURE TEST KIT
KIT MISCELLANEOUS
Qty: 1 KIT | Refills: 0 | Status: SHIPPED | OUTPATIENT
Start: 2024-02-16

## 2024-02-16 NOTE — PROGRESS NOTES
Emily Barone is a 69 y.o. female.    HPI:  Here for bilateral ear discomfort left greater than right  Blood pressure elevated  Restless sleeper, snores... Recommend sleep study    No recent illness, BMI noted    Meds, vitamins and allergies reviewed with pt    Wt Readings from Last 3 Encounters:   02/16/24 125.6 kg (277 lb)   02/09/24 125.2 kg (276 lb)   01/25/24 126.5 kg (278 lb 12.8 oz)       REVIEW OF SYSTEMS:   CONSTITUTIONAL: See history of present illness,   Weight noted   HEENT: No new vision difficulties , bilateral ear discomfort  RESPIRATORY: No new SOB, PND, orthopnea or cough.   CARDIOVASCULAR: no CP, palpitations or SOB with exertion  GI: No nausea, vomiting, diarrhea, constipation, abdominal pain or changes in bowel habits.   : No urinary frequency, urgency, incontinence hematuria or dysuria.   SKIN: No cyanosis or skin lesions.   MUSCULOSKELETAL: No new muscle or joint pain.   NEUROLOGICAL: No syncope or TIA-like symptoms.   PSYCHIATRIC: No anxiety, insomnia or depression     Allergies   Allergen Reactions    Flonase [Fluticasone Propionate] Nausea Only    Levaquin [Levofloxacin In D5w] Other (See Comments)     headache       Prior to Visit Medications    Medication Sig Taking? Authorizing Provider   hydrocortisone 2.5 % cream Apply topically 2 times daily. Yes Karin Olivares MD   Blood Pressure KIT Check BP 2-3 week Yes Karin Olivares MD   vitamin D (CHOLECALCIFEROL) 25 MCG (1000 UT) TABS tablet Take 1 tablet by mouth every other day Patient taking 1000 units every other day. Yes Provider, MD Kaylan   ibuprofen (ADVIL;MOTRIN) 800 MG tablet Take 1 tablet by mouth 3 times daily (with meals) Yes Kev Kaufman MD       Past Medical History:   Diagnosis Date    Abnormal Pap     Arthritis     Cancer (HCC)     Mohs- NBCC right forehead    Depression     Diverticulosis     Hearing loss     Obesity     Tinnitus     Ureteral calculus     Vitamin D deficiency        Social History     Tobacco Use  OK to schedule Bryan for oral baked cheese challenge with preceding prick on prick testing. Thanks,  Noah

## 2024-03-11 ENCOUNTER — TELEPHONE (OUTPATIENT)
Dept: FAMILY MEDICINE CLINIC | Age: 69
End: 2024-03-11

## 2024-03-11 NOTE — TELEPHONE ENCOUNTER
Inside her ears hurt and itch     Feels like she could be dizzy or light headed  but she is not     Head feels like it full  sometimes     Patient thinks it is like something with her ears         Elsy ewing     Patient will need an afternoon appt with Dr Kaufman       Patient also said if she needs an ENT she went to one in [facility]     Please call patient and advise

## 2024-03-12 ENCOUNTER — OFFICE VISIT (OUTPATIENT)
Dept: FAMILY MEDICINE CLINIC | Age: 69
End: 2024-03-12

## 2024-03-12 VITALS
SYSTOLIC BLOOD PRESSURE: 133 MMHG | BODY MASS INDEX: 45.26 KG/M2 | HEART RATE: 74 BPM | DIASTOLIC BLOOD PRESSURE: 78 MMHG | OXYGEN SATURATION: 98 % | WEIGHT: 272 LBS

## 2024-03-12 DIAGNOSIS — C44.319 BASAL CELL CARCINOMA OF RIGHT FOREHEAD: ICD-10-CM

## 2024-03-12 DIAGNOSIS — E66.01 CLASS 3 SEVERE OBESITY DUE TO EXCESS CALORIES WITHOUT SERIOUS COMORBIDITY WITH BODY MASS INDEX (BMI) OF 45.0 TO 49.9 IN ADULT (HCC): Primary | ICD-10-CM

## 2024-03-12 DIAGNOSIS — K57.90 DIVERTICULOSIS: ICD-10-CM

## 2024-03-12 RX ORDER — FLUTICASONE PROPIONATE 50 MCG
2 SPRAY, SUSPENSION (ML) NASAL DAILY
Qty: 16 G | Refills: 5 | Status: SHIPPED | OUTPATIENT
Start: 2024-03-12

## 2024-03-12 RX ORDER — LORATADINE 10 MG/1
10 TABLET ORAL DAILY
Qty: 30 TABLET | Refills: 5 | Status: SHIPPED | OUTPATIENT
Start: 2024-03-12

## 2024-03-12 NOTE — PROGRESS NOTES
Emily Barone is a 69 y.o. female.    HPI:seen 2-16-24 for itchy ear, 1%HC did not help  Ears still itchy, but aquafor helped  Head feels full  No veritgo, no tinnitus  Seeing pod for pain, has toenail fungus, tinea. Pedis  Having sleep study this Sunday  Meds, vitamins and allergies reviewed with pt    ROS: No TIA's or unusual headaches, no dysphagia.  No prolonged cough. No dyspnea or chest pain on exertion.  No abdominal pain, change in bowel habits, black or bloody stools.  No urinary tract symptoms.  No new or unusual musculoskeletal symptoms.       Prior to Visit Medications    Medication Sig Taking? Authorizing Provider   Blood Pressure KIT Check BP 2-3 week Yes Karin Olivares MD   vitamin D (CHOLECALCIFEROL) 25 MCG (1000 UT) TABS tablet Take 1 tablet by mouth every other day Patient taking 1000 units every other day. Yes Provider, MD Kaylan       Past Medical History:   Diagnosis Date    Abnormal Pap     Arthritis     Cancer (HCC)     Mohs- NBCC right forehead    Depression     Diverticulosis     Hearing loss     Obesity     Tinnitus     Ureteral calculus     Vitamin D deficiency        Social History     Tobacco Use    Smoking status: Never    Smokeless tobacco: Never   Vaping Use    Vaping Use: Never used   Substance Use Topics    Alcohol use: No    Drug use: No       Family History   Problem Relation Age of Onset    Heart Disease Mother     Diabetes Mother     Arthritis Mother     Sleep Apnea Brother        Allergies   Allergen Reactions    Flonase [Fluticasone Propionate] Nausea Only    Levaquin [Levofloxacin In D5w] Other (See Comments)     headache       OBJECTIVE:  /78   Pulse 74   Wt 123.4 kg (272 lb)   SpO2 98%   BMI 45.26 kg/m²   GEN:  in NAD, wt down 5#  HEENT:  NCAT, throat: clear  Canal sli flaky, tm dull  NECK:  Supple without adenopathy.  Sinus:nt  CV:  Regular rate and rhythm, S1 and S2 normal, no murmurs, clicks  PULM:  Chest is clear, no wheezing ,  symmetric air entry

## 2024-03-17 ENCOUNTER — HOSPITAL ENCOUNTER (OUTPATIENT)
Dept: SLEEP CENTER | Age: 69
Discharge: HOME | End: 2024-03-17
Payer: MEDICARE

## 2024-03-17 DIAGNOSIS — G47.10 HYPERSOMNIA: ICD-10-CM

## 2024-03-17 DIAGNOSIS — R35.1 NOCTURIA: ICD-10-CM

## 2024-03-17 PROCEDURE — 95810 POLYSOM 6/> YRS 4/> PARAM: CPT

## 2024-03-18 PROCEDURE — 95810 POLYSOM 6/> YRS 4/> PARAM: CPT | Performed by: INTERNAL MEDICINE

## 2024-03-19 ENCOUNTER — HOSPITAL ENCOUNTER (OUTPATIENT)
Dept: MAMMOGRAPHY | Age: 69
Discharge: HOME | End: 2024-03-19
Payer: MEDICARE

## 2024-03-19 ENCOUNTER — TELEPHONE (OUTPATIENT)
Dept: PULMONOLOGY | Age: 69
End: 2024-03-19

## 2024-03-19 DIAGNOSIS — Z12.31 VISIT FOR SCREENING MAMMOGRAM: ICD-10-CM

## 2024-03-19 PROCEDURE — 77063 BREAST TOMOSYNTHESIS BI: CPT

## 2024-03-19 NOTE — TELEPHONE ENCOUNTER
Pt called in to review ss results. Carmelina unavailable at time of call. PT will be available the rest of the day.     [phone]

## 2024-03-20 ENCOUNTER — TELEPHONE (OUTPATIENT)
Dept: PULMONOLOGY | Age: 69
End: 2024-03-20

## 2024-03-20 DIAGNOSIS — G47.33 OBSTRUCTIVE SLEEP APNEA (ADULT) (PEDIATRIC): Primary | ICD-10-CM

## 2024-04-16 ENCOUNTER — HOSPITAL ENCOUNTER (OUTPATIENT)
Dept: SLEEP CENTER | Age: 69
Discharge: HOME | End: 2024-04-16
Payer: MEDICARE

## 2024-04-16 DIAGNOSIS — G47.33 OBSTRUCTIVE SLEEP APNEA (ADULT) (PEDIATRIC): ICD-10-CM

## 2024-04-16 PROCEDURE — 95811 POLYSOM 6/>YRS CPAP 4/> PARM: CPT

## 2024-04-22 ENCOUNTER — TELEPHONE (OUTPATIENT)
Dept: PULMONOLOGY | Age: 69
End: 2024-04-22

## 2024-04-22 NOTE — PROGRESS NOTES
Emily Barone         : 1955  MSC    Diagnosis: [x] YAMILET (G47.33) [] CSA (G47.31) [] Apnea (G47.30)   Length of Need: [x] 18 Months [] 99 Months [] Other:    Machine (CHARLOTTE!): [] Respironics Dream Station   2   Auto [x] ResMed AirSense/AirCurve     Auto S11 or S10  [] Other:     []  CPAP () [x] Bilevel ()   Mode: [] Auto [] Spontaneous    Mode: [x] Auto [] Spontaneous       IPAP max 25 cmH2O  EPAP min 9 cmH2O  PS 3 cmH2O     Comfort Settings:   - Ramp Pressure: 5 cmH2O                                        - Ramp time: 15 min                                     -  Flex/EPR - 3 full time                                    - For ResMed Bilevel (TiMax-4 sec   TiMin- 0.2 sec)     Humidifier: [x] Heated ()        [x] Water chamber replacement ()/ 1 per 6 months        Mask:   [x] Nasal () /1 per 3 months [x] Full Face () /1 per 3 months   [x] Patient choice -Size and fit mask [x] Patient Choice - Size and fit mask   [] Dispense:  [] Dispense:    [x] Headgear () / 1 per 3 months [x] Headgear () / 1 per 3 months   [x] Replacement Nasal Cushion ()/2 per month [x] Interface Replacement ()/1 per month   [x] Replacement Nasal Pillows ()/2 per month         Tubing: [x] Heated ()/1 per 3 months    [] Standard ()/1 per 3 months [] Other:           Filters: [x] Non-disposable ()/1 per 6 months     [x] Ultra-Fine, Disposable ()/2 per month        Miscellaneous: [] Chin Strap ()/ 1 per 6 months [] O2 bleed-in:       LPM   [] Oximetry on CPAP/Bilevel []  Other:    [x] Modem: ()         Start Order Date: 24    MEDICAL JUSTIFICATION:  I, the undersigned, certify that the above prescribed supplies are medically necessary for this patient’s wellbeing.  In my opinion, the supplies are both reasonable and necessary in reference to accepted standards of medicalpractice in treatment of this patient’s condition.    NITA ELIZABETH MD      NPI:

## 2024-05-01 ENCOUNTER — HOSPITAL ENCOUNTER (OUTPATIENT)
Dept: ULTRASOUND IMAGING | Age: 69
Discharge: HOME | End: 2024-05-01
Attending: OBSTETRICS & GYNECOLOGY
Payer: MEDICARE

## 2024-05-01 DIAGNOSIS — N95.0 PMB (POSTMENOPAUSAL BLEEDING): ICD-10-CM

## 2024-05-01 PROCEDURE — 76830 TRANSVAGINAL US NON-OB: CPT

## 2024-05-01 PROCEDURE — 76856 US EXAM PELVIC COMPLETE: CPT

## 2024-05-16 ENCOUNTER — OFFICE VISIT (OUTPATIENT)
Dept: FAMILY MEDICINE CLINIC | Age: 69
End: 2024-05-16

## 2024-05-16 VITALS
WEIGHT: 273 LBS | OXYGEN SATURATION: 99 % | BODY MASS INDEX: 45.43 KG/M2 | SYSTOLIC BLOOD PRESSURE: 110 MMHG | DIASTOLIC BLOOD PRESSURE: 76 MMHG | HEART RATE: 81 BPM

## 2024-05-16 DIAGNOSIS — R42 DIZZINESS: Primary | ICD-10-CM

## 2024-05-16 RX ORDER — MECLIZINE HYDROCHLORIDE 25 MG/1
25 TABLET ORAL 3 TIMES DAILY PRN
Qty: 30 TABLET | Refills: 0 | Status: SHIPPED | OUTPATIENT
Start: 2024-05-16 | End: 2024-05-26

## 2024-05-16 ASSESSMENT — ENCOUNTER SYMPTOMS
RESPIRATORY NEGATIVE: 1
GASTROINTESTINAL NEGATIVE: 1

## 2024-05-16 NOTE — PROGRESS NOTES
Emily Barone (:  1955) is a 69 y.o. female,Established patient, here for evaluation of the following chief complaint(s):  Dizziness (For about a couple of weeks now ) and Otitis Media (For a couple of months )      Assessment & Plan   1. Dizziness  -     meclizine (ANTIVERT) 25 MG tablet; Take 1 tablet by mouth 3 times daily as needed for Dizziness, Disp-30 tablet, R-0Normal      Return if symptoms worsen or fail to improve.       Subjective   HPI  Patient states for the last several days she has noted some lightheadedness/dizziness that comes and goes.  She does not have vertigo.  She is not nauseated.  It is a somewhat mild feeling.  There is nothing that seems to precipitate it.  She states if she seems to sit down and close her eyes and rest it seems to go away.  She is wondering if this is related to her ear    Review of Systems   Constitutional: Negative.    HENT:          Ears itching and Rxed with cream from Dr Kaufman and Elise   Respiratory: Negative.     Cardiovascular: Negative.    Gastrointestinal: Negative.    Genitourinary: Negative.    Neurological:  Positive for dizziness and light-headedness.          Objective   Physical Exam  Constitutional:       General: She is not in acute distress.     Appearance: She is not ill-appearing, toxic-appearing or diaphoretic.   HENT:      Head: Normocephalic and atraumatic.      Right Ear: Tympanic membrane, ear canal and external ear normal. There is no impacted cerumen.      Left Ear: Tympanic membrane, ear canal and external ear normal. There is no impacted cerumen.      Ears:      Comments: Small bubble on inferior right TM  Eyes:      Extraocular Movements: Extraocular movements intact.      Conjunctiva/sclera: Conjunctivae normal.      Pupils: Pupils are equal, round, and reactive to light.   Cardiovascular:      Rate and Rhythm: Normal rate and regular rhythm.   Pulmonary:      Effort: Pulmonary effort is normal.      Breath sounds: Normal

## 2024-07-23 PROBLEM — G47.33 OBSTRUCTIVE SLEEP APNEA SYNDROME: Status: ACTIVE | Noted: 2024-07-23

## 2024-07-31 ENCOUNTER — OFFICE VISIT (OUTPATIENT)
Dept: FAMILY MEDICINE CLINIC | Age: 69
End: 2024-07-31

## 2024-07-31 VITALS
OXYGEN SATURATION: 98 % | SYSTOLIC BLOOD PRESSURE: 131 MMHG | BODY MASS INDEX: 44.76 KG/M2 | HEART RATE: 74 BPM | DIASTOLIC BLOOD PRESSURE: 81 MMHG | WEIGHT: 269 LBS

## 2024-07-31 DIAGNOSIS — R39.9 UTI SYMPTOMS: Primary | ICD-10-CM

## 2024-07-31 LAB
BILIRUBIN, POC: NORMAL
BLOOD URINE, POC: NORMAL
CLARITY, POC: NORMAL
COLOR, POC: YELLOW
GLUCOSE URINE, POC: NORMAL
KETONES, POC: NORMAL
LEUKOCYTE EST, POC: NORMAL
NITRITE, POC: NORMAL
PH, POC: 6.5
PROTEIN, POC: NORMAL
SPECIFIC GRAVITY, POC: 1.02
UROBILINOGEN, POC: 0.2

## 2024-07-31 RX ORDER — IBUPROFEN 800 MG/1
800 TABLET ORAL
Qty: 30 TABLET | Refills: 0 | Status: SHIPPED | OUTPATIENT
Start: 2024-07-31

## 2024-07-31 RX ORDER — METHOCARBAMOL 750 MG/1
750 TABLET, FILM COATED ORAL 3 TIMES DAILY
Qty: 30 TABLET | Refills: 0 | Status: SHIPPED | OUTPATIENT
Start: 2024-07-31 | End: 2024-08-10

## 2024-07-31 NOTE — PROGRESS NOTES
Emily Barone is a 69 y.o. female.    HPI:  69-year-old with severe obesity, history of basal cell carcinoma and obstructive sleep apnea presents with back pain 2 days ago, no known injury, has tried advil, heat, icy hot  No dysuria, no frequency, no fever  No saddle anestheisia, no sciaitica, no incontinence  Wt Readings from Last 3 Encounters:   07/31/24 122 kg (269 lb)   07/23/24 122.9 kg (271 lb)   05/16/24 123.8 kg (273 lb)     Meds, vitamins and allergies reviewed with Patient    ROS:  Gen: no fever  HEENT:no  cold symptoms,no sore throat.  CV:  Denies chest pain or palpitations.  Pulm:  Denies shortness of breath, cough.  Abd:  Denies abdominal pain, nausea and vomiting.  Skin: no rash    Allergies   Allergen Reactions    Flonase [Fluticasone Propionate] Nausea Only    Levaquin [Levofloxacin In D5w] Other (See Comments)     headache       Prior to Visit Medications    Medication Sig Taking? Authorizing Provider   Blood Pressure KIT Check BP 2-3 week Yes Karin Olivares MD   vitamin D (CHOLECALCIFEROL) 25 MCG (1000 UT) TABS tablet Take 1 tablet by mouth every other day Patient taking 1000 units every other day. Yes ProviderKaylan MD       OBJECTIVE:  /81   Pulse 74   Wt 122 kg (269 lb)   SpO2 98%   BMI 44.76 kg/m²   GEN:  in NAD, obewse, wty down 2#  CV:  Regular rate and rhythm, S1 and S2 normal, no murmurs, clicks  PULM:  Chest is clear, no wheezing ,  symmetric air entry throughout both lung fields.  No CVAT  NEURO: Alert and oriented ×3  Ua -     ASSESSMENT/PLAN:  Mid low back pain    Motrin  Robaxin  Back sheet  Heat  Consider PT/xrays ,etc

## 2024-11-04 ENCOUNTER — TELEPHONE (OUTPATIENT)
Dept: GYNECOLOGY | Age: 69
End: 2024-11-04

## 2024-11-04 DIAGNOSIS — N95.0 PMB (POSTMENOPAUSAL BLEEDING): Primary | ICD-10-CM

## 2024-11-04 DIAGNOSIS — N88.2 CERVICAL OS STENOSIS: ICD-10-CM

## 2024-11-04 NOTE — TELEPHONE ENCOUNTER
Patient says its time for her to have another vaginal us. Requesting that an order is put in. Patient also explained that its hard for her to hold it when she has a full bladder. Was asking if there was another way to get the test but not have a full bladder. Patient says when she has to go she can't hold it.       Patient can be reached at [phone]

## 2024-11-04 NOTE — TELEPHONE ENCOUNTER
Please instruct the patient to call central scheduling and schedule her pelvic ultrasound.    Advised her that she could do the best she can with respect to holding her urine.

## 2024-11-06 ENCOUNTER — OFFICE VISIT (OUTPATIENT)
Dept: FAMILY MEDICINE CLINIC | Age: 69
End: 2024-11-06

## 2024-11-06 VITALS
SYSTOLIC BLOOD PRESSURE: 114 MMHG | BODY MASS INDEX: 44.48 KG/M2 | WEIGHT: 267 LBS | HEART RATE: 68 BPM | OXYGEN SATURATION: 98 % | HEIGHT: 65 IN | DIASTOLIC BLOOD PRESSURE: 74 MMHG

## 2024-11-06 DIAGNOSIS — E66.01 CLASS 3 SEVERE OBESITY DUE TO EXCESS CALORIES WITHOUT SERIOUS COMORBIDITY WITH BODY MASS INDEX (BMI) OF 45.0 TO 49.9 IN ADULT: Chronic | ICD-10-CM

## 2024-11-06 DIAGNOSIS — G47.33 OBSTRUCTIVE SLEEP APNEA SYNDROME: ICD-10-CM

## 2024-11-06 DIAGNOSIS — E66.813 CLASS 3 SEVERE OBESITY DUE TO EXCESS CALORIES WITHOUT SERIOUS COMORBIDITY WITH BODY MASS INDEX (BMI) OF 45.0 TO 49.9 IN ADULT: Chronic | ICD-10-CM

## 2024-11-06 DIAGNOSIS — C44.319 BASAL CELL CARCINOMA OF RIGHT FOREHEAD: ICD-10-CM

## 2024-11-06 DIAGNOSIS — Z00.00 MEDICARE ANNUAL WELLNESS VISIT, SUBSEQUENT: Primary | ICD-10-CM

## 2024-11-06 SDOH — ECONOMIC STABILITY: FOOD INSECURITY: WITHIN THE PAST 12 MONTHS, THE FOOD YOU BOUGHT JUST DIDN'T LAST AND YOU DIDN'T HAVE MONEY TO GET MORE.: NEVER TRUE

## 2024-11-06 SDOH — ECONOMIC STABILITY: INCOME INSECURITY: HOW HARD IS IT FOR YOU TO PAY FOR THE VERY BASICS LIKE FOOD, HOUSING, MEDICAL CARE, AND HEATING?: NOT HARD AT ALL

## 2024-11-06 SDOH — ECONOMIC STABILITY: FOOD INSECURITY: WITHIN THE PAST 12 MONTHS, YOU WORRIED THAT YOUR FOOD WOULD RUN OUT BEFORE YOU GOT MONEY TO BUY MORE.: NEVER TRUE

## 2024-11-06 ASSESSMENT — PATIENT HEALTH QUESTIONNAIRE - PHQ9
2. FEELING DOWN, DEPRESSED OR HOPELESS: NOT AT ALL
SUM OF ALL RESPONSES TO PHQ QUESTIONS 1-9: 0
SUM OF ALL RESPONSES TO PHQ QUESTIONS 1-9: 0
1. LITTLE INTEREST OR PLEASURE IN DOING THINGS: NOT AT ALL
SUM OF ALL RESPONSES TO PHQ9 QUESTIONS 1 & 2: 0
SUM OF ALL RESPONSES TO PHQ QUESTIONS 1-9: 0
SUM OF ALL RESPONSES TO PHQ QUESTIONS 1-9: 0

## 2024-11-06 ASSESSMENT — LIFESTYLE VARIABLES
HOW MANY STANDARD DRINKS CONTAINING ALCOHOL DO YOU HAVE ON A TYPICAL DAY: PATIENT DOES NOT DRINK
HOW OFTEN DO YOU HAVE A DRINK CONTAINING ALCOHOL: NEVER

## 2024-11-06 NOTE — PROGRESS NOTES
Medicare Annual Wellness Visit    Emily Barone is here for No chief complaint on file.    Assessment & Plan   Medicare annual wellness visit, subsequent  -     CBC with Auto Differential; Future  -     Comprehensive Metabolic Panel, Fasting; Future  -     Lipid, Fasting; Future  -     TSH with Reflex; Future  -     Hemoglobin A1C; Future  Class 3 severe obesity due to excess calories without serious comorbidity with body mass index (BMI) of 45.0 to 49.9 in adult  Basal cell carcinoma of right forehead  Obstructive sleep apnea syndrome    Recommendations for Preventive Services Due: see orders and patient instructions/AVS.  Recommended screening schedule for the next 5-10 years is provided to the patient in written form: see Patient Instructions/AVS.     No follow-ups on file.     Subjective   Doing well overall  Compliant with her CPAP  Plans to follow-up with her GYN for history of postmenopausal vaginal spotting and get another pelvic ultrasound  Not exercising much  Does do her own housework takes her mom to all her doctors appointments goes with her boyfriend.  All his doctor appointments and stays active    Patient's complete Health Risk Assessment and screening values have been reviewed and are found in Flowsheets. The following problems were reviewed today and where indicated follow up appointments were made and/or referrals ordered.    Positive Risk Factor Screenings with Interventions:              Inactivity:  On average, how many days per week do you engage in moderate to strenuous exercise (like a brisk walk)?: 0 days (!) Abnormal  On average, how many minutes do you engage in exercise at this level?: 0 min  Interventions:  Patient declined any further interventions or treatment     Abnormal BMI (obese):  Body mass index is 44.43 kg/m². (!) Abnormal  Interventions:  Patient declines any further evaluation or treatment           Safety:  Do you have working smoke detectors?: (!) No  Do you have any

## 2024-11-08 DIAGNOSIS — Z00.00 MEDICARE ANNUAL WELLNESS VISIT, SUBSEQUENT: ICD-10-CM

## 2024-11-09 LAB
ALBUMIN SERPL-MCNC: 4.4 G/DL (ref 3.4–5)
ALBUMIN/GLOB SERPL: 1.8 {RATIO} (ref 1.1–2.2)
ALP SERPL-CCNC: 75 U/L (ref 40–129)
ALT SERPL-CCNC: 13 U/L (ref 10–40)
ANION GAP SERPL CALCULATED.3IONS-SCNC: 12 MMOL/L (ref 3–16)
AST SERPL-CCNC: 24 U/L (ref 15–37)
BASOPHILS # BLD: 0.1 K/UL (ref 0–0.2)
BASOPHILS NFR BLD: 1 %
BILIRUB SERPL-MCNC: 0.5 MG/DL (ref 0–1)
BUN SERPL-MCNC: 13 MG/DL (ref 7–20)
CALCIUM SERPL-MCNC: 9.5 MG/DL (ref 8.3–10.6)
CHLORIDE SERPL-SCNC: 103 MMOL/L (ref 99–110)
CHOLEST SERPL-MCNC: 162 MG/DL (ref 0–199)
CO2 SERPL-SCNC: 24 MMOL/L (ref 21–32)
CREAT SERPL-MCNC: 0.8 MG/DL (ref 0.6–1.2)
DEPRECATED RDW RBC AUTO: 13.8 % (ref 12.4–15.4)
EOSINOPHIL # BLD: 0.1 K/UL (ref 0–0.6)
EOSINOPHIL NFR BLD: 1 %
EST. AVERAGE GLUCOSE BLD GHB EST-MCNC: 119.8 MG/DL
GFR SERPLBLD CREATININE-BSD FMLA CKD-EPI: 80 ML/MIN/{1.73_M2}
GLUCOSE P FAST SERPL-MCNC: 100 MG/DL (ref 70–99)
HBA1C MFR BLD: 5.8 %
HCT VFR BLD AUTO: 41.1 % (ref 36–48)
HDLC SERPL-MCNC: 50 MG/DL (ref 40–60)
HGB BLD-MCNC: 14 G/DL (ref 12–16)
LDL CHOLESTEROL: 97 MG/DL
LYMPHOCYTES # BLD: 1.7 K/UL (ref 1–5.1)
LYMPHOCYTES NFR BLD: 27.9 %
MCH RBC QN AUTO: 33.4 PG (ref 26–34)
MCHC RBC AUTO-ENTMCNC: 34.1 G/DL (ref 31–36)
MCV RBC AUTO: 97.7 FL (ref 80–100)
MONOCYTES # BLD: 0.4 K/UL (ref 0–1.3)
MONOCYTES NFR BLD: 6.8 %
NEUTROPHILS # BLD: 4 K/UL (ref 1.7–7.7)
NEUTROPHILS NFR BLD: 63.3 %
PLATELET # BLD AUTO: 319 K/UL (ref 135–450)
PMV BLD AUTO: 8.9 FL (ref 5–10.5)
POTASSIUM SERPL-SCNC: 4.4 MMOL/L (ref 3.5–5.1)
PROT SERPL-MCNC: 6.8 G/DL (ref 6.4–8.2)
RBC # BLD AUTO: 4.21 M/UL (ref 4–5.2)
SODIUM SERPL-SCNC: 139 MMOL/L (ref 136–145)
TRIGL SERPL-MCNC: 73 MG/DL (ref 0–150)
TSH SERPL DL<=0.005 MIU/L-ACNC: 2.11 UIU/ML (ref 0.27–4.2)
VLDLC SERPL CALC-MCNC: 15 MG/DL
WBC # BLD AUTO: 6.3 K/UL (ref 4–11)

## 2024-11-12 ENCOUNTER — HOSPITAL ENCOUNTER (OUTPATIENT)
Dept: ULTRASOUND IMAGING | Age: 69
Discharge: HOME | End: 2024-11-12
Attending: OBSTETRICS & GYNECOLOGY
Payer: MEDICARE

## 2024-11-12 DIAGNOSIS — N95.0 PMB (POSTMENOPAUSAL BLEEDING): ICD-10-CM

## 2024-11-12 DIAGNOSIS — N88.2 CERVICAL OS STENOSIS: ICD-10-CM

## 2024-11-12 PROCEDURE — 76830 TRANSVAGINAL US NON-OB: CPT

## 2024-11-12 PROCEDURE — 76856 US EXAM PELVIC COMPLETE: CPT

## 2025-03-05 ENCOUNTER — OFFICE VISIT (OUTPATIENT)
Dept: FAMILY MEDICINE CLINIC | Age: 70
End: 2025-03-05

## 2025-03-05 VITALS
HEART RATE: 77 BPM | SYSTOLIC BLOOD PRESSURE: 112 MMHG | TEMPERATURE: 97.4 F | BODY MASS INDEX: 44.15 KG/M2 | HEIGHT: 65 IN | DIASTOLIC BLOOD PRESSURE: 86 MMHG | OXYGEN SATURATION: 95 % | WEIGHT: 265 LBS

## 2025-03-05 DIAGNOSIS — H81.13 BENIGN PAROXYSMAL POSITIONAL VERTIGO DUE TO BILATERAL VESTIBULAR DISORDER: Primary | ICD-10-CM

## 2025-03-05 RX ORDER — MECLIZINE HYDROCHLORIDE 25 MG/1
25 TABLET ORAL 3 TIMES DAILY PRN
Qty: 15 TABLET | Refills: 0 | Status: SHIPPED | OUTPATIENT
Start: 2025-03-05 | End: 2025-03-15

## 2025-03-05 SDOH — ECONOMIC STABILITY: FOOD INSECURITY: WITHIN THE PAST 12 MONTHS, THE FOOD YOU BOUGHT JUST DIDN'T LAST AND YOU DIDN'T HAVE MONEY TO GET MORE.: NEVER TRUE

## 2025-03-05 SDOH — ECONOMIC STABILITY: FOOD INSECURITY: WITHIN THE PAST 12 MONTHS, YOU WORRIED THAT YOUR FOOD WOULD RUN OUT BEFORE YOU GOT MONEY TO BUY MORE.: NEVER TRUE

## 2025-03-05 ASSESSMENT — PATIENT HEALTH QUESTIONNAIRE - PHQ9
SUM OF ALL RESPONSES TO PHQ QUESTIONS 1-9: 0
1. LITTLE INTEREST OR PLEASURE IN DOING THINGS: NOT AT ALL
SUM OF ALL RESPONSES TO PHQ QUESTIONS 1-9: 0
SUM OF ALL RESPONSES TO PHQ QUESTIONS 1-9: 0
2. FEELING DOWN, DEPRESSED OR HOPELESS: NOT AT ALL
SUM OF ALL RESPONSES TO PHQ QUESTIONS 1-9: 0

## 2025-03-05 NOTE — PROGRESS NOTES
Size: Large Adult)   Pulse 77   Temp 97.4 °F (36.3 °C)   Ht 1.651 m (5' 5\")   Wt 120.2 kg (265 lb)   SpO2 95%   BMI 44.10 kg/m²     Wt Readings from Last 3 Encounters:   03/05/25 120.2 kg (265 lb)   11/06/24 121.1 kg (267 lb)   10/24/24 121.1 kg (267 lb)        Physical Exam  General:  Well-appearing, NAD, alert, non-toxic  HEENT:  Normocephalic, atraumatic. Pupils equal and round.   CHEST/LUNGS: CTAB, no crackles, no wheeze, no rhonchi. Symmetric rise  CARDIOVASCULAR: RRR,  no murmur, no rub  EXTREMETIES: Normal movement of all extremities  SKIN:  No rash, no cellulitis, no bruising, no petechiae/purpura/vesicles/pustules/abscess  PSYCH:  A+O x 3; normal affect  NEURO:  GCS 15, CN2-12 grossly intact, no focal motor/sensory deficits, no cerebellar deficits, normal gait, normal speech      Assessment/Plan     69yo female with dizziness, likely vertigo. Will treat with Meclizine.    Discussed with patient medication/s dosage, instructions, potential S/E, A/R and Drug Interaction  Tylenol or Motrin as needed for pain or fever  Advise to return here if worse or go to nearest ER  Encourage fluids  Pt discharged in stable condition at 16:09      1. Benign paroxysmal positional vertigo due to bilateral vestibular disorder  -     meclizine (ANTIVERT) 25 MG tablet; Take 1 tablet by mouth 3 times daily as needed for Dizziness or Nausea, Disp-15 tablet, R-0Normal       No orders of the defined types were placed in this encounter.      No follow-ups on file.    DALILA MOMIN MD, MD    3/5/2025  4:08 PM

## 2025-03-20 ENCOUNTER — RESULTS FOLLOW-UP (OUTPATIENT)
Dept: MAMMOGRAPHY | Age: 70
End: 2025-03-20

## 2025-03-20 ENCOUNTER — HOSPITAL ENCOUNTER (OUTPATIENT)
Dept: MAMMOGRAPHY | Age: 70
Discharge: HOME | End: 2025-03-20
Payer: MEDICARE

## 2025-03-20 VITALS — HEIGHT: 65 IN | BODY MASS INDEX: 44.15 KG/M2 | WEIGHT: 265 LBS

## 2025-03-20 DIAGNOSIS — Z12.31 VISIT FOR SCREENING MAMMOGRAM: ICD-10-CM

## 2025-03-20 PROCEDURE — 77067 SCR MAMMO BI INCL CAD: CPT

## 2025-03-21 ENCOUNTER — OFFICE VISIT (OUTPATIENT)
Dept: FAMILY MEDICINE CLINIC | Age: 70
End: 2025-03-21

## 2025-03-21 ENCOUNTER — TELEPHONE (OUTPATIENT)
Dept: FAMILY MEDICINE CLINIC | Age: 70
End: 2025-03-21

## 2025-03-21 VITALS
OXYGEN SATURATION: 97 % | HEART RATE: 67 BPM | DIASTOLIC BLOOD PRESSURE: 78 MMHG | BODY MASS INDEX: 43.52 KG/M2 | WEIGHT: 261.2 LBS | HEIGHT: 65 IN | SYSTOLIC BLOOD PRESSURE: 118 MMHG

## 2025-03-21 DIAGNOSIS — H81.13 BENIGN PAROXYSMAL POSITIONAL VERTIGO DUE TO BILATERAL VESTIBULAR DISORDER: Primary | ICD-10-CM

## 2025-03-21 RX ORDER — MECLIZINE HYDROCHLORIDE 25 MG/1
TABLET ORAL
Qty: 30 TABLET | Refills: 0 | Status: SHIPPED | OUTPATIENT
Start: 2025-03-21

## 2025-03-21 NOTE — TELEPHONE ENCOUNTER
[facility] transfer to Nurse triage    Patient is calling with complaint of lightheaded when she woke to go to the restroom up  she has been seen before for vertigo denies other symptoms the patient has not taken any vitals      This has been going on  1 weeks 2 days of hot flash feelings of heat in her body       Patient is scheduled today      Has patient tried any over the counter medications? no

## 2025-03-21 NOTE — PROGRESS NOTES
No bruit  CV:  Regular rate and rhythm, S1 and S2 normal, no murmurs, clicks  PULM:  Chest is clear, no wheezing ,  symmetric air entry throughout both lung fields.  EXT: No rash or edema  NEURO: nonfocal.  Cranial nerves II through XII intact.  Pronator drift negative.  Speech normal.   5 out of 5.  Gait normal.  Questionable mild left inferior temporal visual field defect on direct confrontation    ASSESSMENT/PLAN:  #1 vertigo - BPV  Antivert-25 to 50 mg 3 times daily for 1 to 2 days then decrease as needed  Head ct or MRI prn only  Consider vestibular rehab as needed

## 2025-05-06 ENCOUNTER — OFFICE VISIT (OUTPATIENT)
Dept: GYNECOLOGY | Age: 70
End: 2025-05-06
Payer: MEDICARE

## 2025-05-06 VITALS
OXYGEN SATURATION: 97 % | BODY MASS INDEX: 44.4 KG/M2 | HEART RATE: 65 BPM | SYSTOLIC BLOOD PRESSURE: 126 MMHG | WEIGHT: 266.8 LBS | DIASTOLIC BLOOD PRESSURE: 84 MMHG

## 2025-05-06 DIAGNOSIS — Z01.419 WELL WOMAN EXAM WITH ROUTINE GYNECOLOGICAL EXAM: Primary | ICD-10-CM

## 2025-05-06 PROCEDURE — G8417 CALC BMI ABV UP PARAM F/U: HCPCS | Performed by: OBSTETRICS & GYNECOLOGY

## 2025-05-06 PROCEDURE — G8427 DOCREV CUR MEDS BY ELIG CLIN: HCPCS | Performed by: OBSTETRICS & GYNECOLOGY

## 2025-05-06 PROCEDURE — G0101 CA SCREEN;PELVIC/BREAST EXAM: HCPCS | Performed by: OBSTETRICS & GYNECOLOGY

## 2025-05-06 NOTE — PROGRESS NOTES
Emily Barone is an 70 y.o. year old woman who presents for annual gyn exam.    No menopausal bleeding.    REVIEW OF SYSTEMS:  No complaints of symptoms involving:  Constitutional: there has been no unanticipated weight loss. There's been no change in activity level. Negative for fever, chills.  Eyes: No visual changes, double vision, or scotomata. No scleral icterus.  HENT: No Headaches, hearing loss or vertigo. No sore throat, ear pain or nasal congestion  Respiratory: no cough or wheezing, no sputum production, no hemoptysis.    Gastrointestinal: No abdominal pain, appetite loss, blood in stools. No change in bowel habits.  Genitourinary: No dysuria, trouble voiding, or hematuria. No change in bladder habits.  Musculoskeletal:  No gait disturbance,no weakness or joint complaints.  Skin: No rash or pruritis.  Neurological: No headache, vision changes, change in muscle strength, numbness or tingling. No change in gait, balance, coordination.  Psychiatric: No anxiety, or depression. No change in mood or behavior.  Endocrine: No temperature intolerance. No excessive thirst, fluid intake, or urination. No tremor.  Hematologic/Lymphatic: No abnormal bruising or bleeding, blood clots or swollen lymph nodes.       Patient Active Problem List   Diagnosis    Class 3 severe obesity due to excess calories without serious comorbidity with body mass index (BMI) of 45.0 to 49.9 in adult (HCC)    Vitamin D deficiency    Basal cell carcinoma of right forehead    Basal cell carcinoma    Abscess of buttock, right    Pain in lower jaw    Sensorineural hearing loss, bilateral    Elevated hemoglobin A1c    Diverticulosis    Obstructive sleep apnea syndrome     Current Outpatient Medications   Medication Sig Dispense Refill    meclizine (ANTIVERT) 25 MG tablet 1 - 2 po tid prn vertigo 30 tablet 0    Blood Pressure KIT Check BP 2-3 week 1 kit 0    vitamin D (CHOLECALCIFEROL) 25 MCG (1000 UT) TABS tablet Take 1 tablet by mouth every other

## 2025-05-06 NOTE — PROGRESS NOTES
The sensitive parts of the examination were performed with Ernst Mcintyre MA as a chaperone.  Ernst was present during the entirety of the sensitive parts of the examination.

## 2025-06-16 ENCOUNTER — TELEPHONE (OUTPATIENT)
Dept: FAMILY MEDICINE CLINIC | Age: 70
End: 2025-06-16

## 2025-08-06 ENCOUNTER — OFFICE VISIT (OUTPATIENT)
Dept: FAMILY MEDICINE CLINIC | Age: 70
End: 2025-08-06

## 2025-08-06 VITALS
BODY MASS INDEX: 44.02 KG/M2 | SYSTOLIC BLOOD PRESSURE: 139 MMHG | RESPIRATION RATE: 16 BRPM | HEART RATE: 71 BPM | WEIGHT: 264.2 LBS | HEIGHT: 65 IN | DIASTOLIC BLOOD PRESSURE: 72 MMHG | OXYGEN SATURATION: 97 %

## 2025-08-06 DIAGNOSIS — G89.29 CHRONIC RIGHT SHOULDER PAIN: Primary | ICD-10-CM

## 2025-08-06 DIAGNOSIS — R20.0 NUMBNESS AND TINGLING OF RIGHT UPPER EXTREMITY: ICD-10-CM

## 2025-08-06 DIAGNOSIS — M25.511 CHRONIC RIGHT SHOULDER PAIN: Primary | ICD-10-CM

## 2025-08-06 DIAGNOSIS — R20.2 NUMBNESS AND TINGLING OF RIGHT UPPER EXTREMITY: ICD-10-CM

## 2025-08-07 ENCOUNTER — HOSPITAL ENCOUNTER (OUTPATIENT)
Age: 70
Discharge: HOME | End: 2025-08-07
Payer: MEDICARE

## 2025-08-07 ENCOUNTER — HOSPITAL ENCOUNTER (OUTPATIENT)
Dept: GENERAL RADIOLOGY | Age: 70
Discharge: HOME | End: 2025-08-07
Payer: MEDICARE

## 2025-08-07 DIAGNOSIS — G89.29 CHRONIC RIGHT SHOULDER PAIN: ICD-10-CM

## 2025-08-07 DIAGNOSIS — R20.0 NUMBNESS AND TINGLING OF RIGHT UPPER EXTREMITY: ICD-10-CM

## 2025-08-07 DIAGNOSIS — M25.511 CHRONIC RIGHT SHOULDER PAIN: ICD-10-CM

## 2025-08-07 DIAGNOSIS — R20.2 NUMBNESS AND TINGLING OF RIGHT UPPER EXTREMITY: ICD-10-CM

## 2025-08-07 PROCEDURE — 72040 X-RAY EXAM NECK SPINE 2-3 VW: CPT

## 2025-08-07 PROCEDURE — 73030 X-RAY EXAM OF SHOULDER: CPT

## (undated) DEVICE — PAD,NON-ADHERENT,3X8,STERILE,LF,1/PK: Brand: MEDLINE

## (undated) DEVICE — PROCTO SWABS: Brand: DEROYAL

## (undated) DEVICE — MAT FLR W32XL58IN

## (undated) DEVICE — GOWN,SIRUS,POLYRNF,BRTHSLV,LG,30/CS: Brand: MEDLINE

## (undated) DEVICE — GLOVE ORTHO 8   MSG9480

## (undated) DEVICE — STANDARD D&C: Brand: MEDLINE INDUSTRIES, INC.

## (undated) DEVICE — 1000ML,PRESSURE INFUSER W/STOPCOCK: Brand: MEDLINE

## (undated) DEVICE — SET EXTN PRIMING 4.9ML L30IN INCL SLDE CLMP SPIN M LUERLOCK

## (undated) DEVICE — TOWEL,STOP FLAG GOLD N-W: Brand: MEDLINE

## (undated) DEVICE — SET,IRRIGATION,CYSTO,Y-TYPE,90": Brand: MEDLINE

## (undated) DEVICE — STRAP RESTRN W3.5XL18IN STD ALL PURP DISP W/ SLNG RNG

## (undated) DEVICE — LEGGINGS, PAIR, 31X48, STERILE: Brand: MEDLINE

## (undated) DEVICE — PREMIUM WET SKIN PREP TRAY: Brand: MEDLINE INDUSTRIES, INC.

## (undated) DEVICE — SOLUTION INJ LR VISIV 1000ML BG

## (undated) DEVICE — DRAPE,UNDERBUTTOCKS,PCH,STERILE: Brand: MEDLINE

## (undated) DEVICE — CYSTO/BLADDER IRRIGATION SET, REGULATING CLAMP

## (undated) DEVICE — JEWISH HOSPITAL TURNOVER KIT: Brand: MEDLINE INDUSTRIES, INC.

## (undated) DEVICE — MATERNITY PAD,HEAVY: Brand: CURITY

## (undated) DEVICE — GARMENT,MEDLINE,DVT,INT,CALF,MED, GEN2: Brand: MEDLINE

## (undated) DEVICE — COVER LT HNDL BLU PLAS

## (undated) DEVICE — TUBING, SUCTION, 1/4" X 12', STRAIGHT: Brand: MEDLINE

## (undated) DEVICE — GOWN,SIRUS,NONRNF,SETINSLV,2XL,18/CS: Brand: MEDLINE